# Patient Record
Sex: MALE | Race: BLACK OR AFRICAN AMERICAN | Employment: FULL TIME | URBAN - METROPOLITAN AREA
[De-identification: names, ages, dates, MRNs, and addresses within clinical notes are randomized per-mention and may not be internally consistent; named-entity substitution may affect disease eponyms.]

---

## 2017-03-01 ENCOUNTER — OFFICE VISIT (OUTPATIENT)
Dept: ENDOCRINOLOGY | Age: 52
End: 2017-03-01

## 2017-03-01 VITALS
SYSTOLIC BLOOD PRESSURE: 120 MMHG | TEMPERATURE: 98 F | HEIGHT: 70 IN | DIASTOLIC BLOOD PRESSURE: 96 MMHG | WEIGHT: 255 LBS | BODY MASS INDEX: 36.51 KG/M2 | HEART RATE: 91 BPM | RESPIRATION RATE: 14 BRPM

## 2017-03-01 DIAGNOSIS — I10 ESSENTIAL HYPERTENSION: ICD-10-CM

## 2017-03-01 DIAGNOSIS — E78.2 MIXED HYPERLIPIDEMIA: ICD-10-CM

## 2017-03-01 LAB
GLUCOSE POC: 185 MG/DL
HBA1C MFR BLD HPLC: 9.9 %

## 2017-03-01 RX ORDER — LANCETS
EACH MISCELLANEOUS
Qty: 100 EACH | Refills: 6 | Status: SHIPPED | OUTPATIENT
Start: 2017-03-01 | End: 2022-05-13

## 2017-03-01 RX ORDER — GLIPIZIDE 5 MG/1
TABLET ORAL
Qty: 60 TAB | Refills: 6 | Status: SHIPPED | OUTPATIENT
Start: 2017-03-01 | End: 2019-10-07 | Stop reason: SDUPTHER

## 2017-03-01 NOTE — PATIENT INSTRUCTIONS
Check blood sugars only twice a day by rotation as follows    First day - before b-fast and - before lunch  Second day- before dinner and  before bed time    After 2 days go back to same rotation            Start on Invokana  300 mg once a day before b-fast ( drink plenty of water )    Start on bydureon 2 mg a day ( preferred )  Victoza 0.6 mg a day and after a week, increase to 1.2 mg a day ( non-preferred )    Stay on  Kombiglyze xr       Start on glipizide 5 mg twice a day , twenty min before b-fast and dinner         Do not skip meals  Do not eat in between meals    Reduce carbs- pasta, rice, potatoes, bread   Do not drink juices or sodas  Donot eat peanut butter     Do not eat sugar free cookies and cakes   Do not eat peaches, grapes, oranges, raisins, pineapples

## 2017-03-01 NOTE — PROGRESS NOTES
HISTORY OF PRESENT ILLNESS   Fermín Ortiz is a 54 y.o. male. HPI   F/u for DM 2 after 2 year again after last visit from Dec  2014   He visits once every 2 years, that has become norm for him    He could not f/u sooner   Because of insurance reasons he says   He get KG xr at no cost - surprise? He could nto tolerate Trulicity for severe GI side effects , stopped it   He could nto get victoza as plan changed       He has no meter, had not f/u with his pcp   He does feel  hyperglycemic symptoms , polyuria and extreme thirst         Past Medical History:   Diagnosis Date    Hyperlipidemia 8/19/2011       Social History     Social History    Marital status:      Spouse name: N/A    Number of children: N/A    Years of education: N/A     Occupational History    Not on file. Social History Main Topics    Smoking status: Never Smoker    Smokeless tobacco: Not on file    Alcohol use No    Drug use: No    Sexual activity: Yes     Partners: Female     Other Topics Concern    Not on file     Social History Narrative       Family History   Problem Relation Age of Onset    Heart Disease Mother     Hypertension Mother     Diabetes Father     Hypertension Father                   Review of Systems   Constitutional: Negative. HENT: Negative. Eyes: Negative for pain and redness. Respiratory: Negative. Cardiovascular: Negative for chest pain, palpitations and leg swelling. Gastrointestinal: Negative. Negative for constipation. Genitourinary: polyuria   Musculoskeletal: Negative for myalgias. Skin: Negative. Neurological: Negative. Endo/Heme/Allergies: Negative. Psychiatric/Behavioral: Negative for depression and memory loss. The patient does not have insomnia               Physical Exam   Constitutional: He is oriented to person, place, and time. He appears well-developed and well-nourished. HENT:   Head: Normocephalic.    Eyes: Conjunctivae and extraocular motions are normal. Pupils are equal, round, and reactive to light. Neck: Normal range of motion. Neck supple. No JVD present. No tracheal deviation present. No thyromegaly present. Cardiovascular: Normal rate, regular rhythm and normal heart sounds. Pulmonary/Chest: Effort normal and breath sounds normal.   Abdominal: Soft. Bowel sounds are normal.   Musculoskeletal: Normal range of motion. Lymphadenopathy:   He has no cervical adenopathy. Neurological: He is alert and oriented to person, place, and time. He has normal reflexes. Skin: Skin is warm. Psychiatric: He has a normal mood and affect.             ASSESSMENT and PLAN            1. DM 2 poorly cotrolled : A1C is   9.9 %  From    March 2017    compared to 7.7 % From dec 2014 compared to 7.3 % compared to 7.5 % jak from 6.2%. stopped Byetta     Lost control from not being compliant with diet and meds   Continue on kombiglyze xr 2.5/1 gm bid     victoza was stopped by formulary   Trulicity was stopped because of severe GI issues     Made him check on deductible today with me - it is 3000 $    Start on Invokana  300 mg once a day before b-fast ( drink plenty of water )  Start on bydureon 2 mg a day ( preferred )  Stay on  Kombiglyze xr     Start on glipizide 5 mg twice a day , twenty min before b-fast and dinner    If he calls back, I need to give him meds where he gets some deductible help           2. HTN : well controlled on norvasc and diovan hctz   Dr. Dorina Curran prescribes them         3. Lipids used to be in good range .    Encouraging to stay on low dose statin and has not gotten on it await labs now         ASA dailly      Labs today   F/u sooner as there was a gap of 2 years since last seen        > 50 % of time is spent on counseling about deductible, choosing right meds, med actions and f/u

## 2017-03-01 NOTE — MR AVS SNAPSHOT
Visit Information Date & Time Provider Department Dept. Phone Encounter #  
 3/1/2017  9:15 AM Alli Gotit MD [facility] Diabetes & Endocrinology [phone] [id number] Follow-up Instructions Return in about 1 month (around 4/1/2017). Upcoming Health Maintenance Date Due  
 FOOT EXAM Q1 12/2/1977 EYE EXAM RETINAL OR DILATED Q1 12/2/1977 Pneumococcal 19-64 Medium Risk (1 of 1 - PPSV23) 12/2/1986 DTaP/Tdap/Td series (1 - Tdap) 12/2/1988 MICROALBUMIN Q1 8/8/2012 HEMOGLOBIN A1C Q6M 11/6/2014 LIPID PANEL Q1 5/6/2015 INFLUENZA AGE 9 TO ADULT 8/1/2016 Allergies as of 3/1/2017  Review Complete On: 3/1/2017 By: Alli Gotti MD  
  
 Severity Noted Reaction Type Reactions Codeine High 02/01/2011   Systemic Other (comments) Pt stated that it makes him \"crazy\"-not aware of actions. Current Immunizations  Never Reviewed No immunizations on file. Not reviewed this visit You Were Diagnosed With   
  
 Codes Comments Uncontrolled type 2 diabetes mellitus with complication, without long-term current use of insulin ([facility] [address]    -  Primary ICD-10-CM: E11.8, E11.65 ICD-9-CM: 250.82 Essential hypertension     ICD-10-CM: I10 
ICD-9-CM: 401.9 Mixed hyperlipidemia     ICD-10-CM: E78.2 ICD-9-CM: 272.2 Vitals Smoking Status Never Smoker Preferred Pharmacy Pharmacy Name Phone [address] [address] [phone] Your Updated Medication List  
  
   
This list is accurate as of: 3/1/17  9:52 AM.  Always use your most recent med list. AMLODIPINE PO Take  by mouth. aspirin delayed-release 81 mg tablet Take 81 mg by mouth daily. DIOVAN -12.5 mg per tablet Generic drug:  valsartan-hydroCHLOROthiazide Take 1 Tab by mouth daily. * dulaglutide 0.75 mg/0.5 mL sub-q pen Commonly known as:  TRULICITY 0.75 mg by SubCUTAneous route every seven (7) days. * dulaglutide 1.5 mg/0.5 mL sub-q pen Commonly known as:  TRULICITY  
1.5 mg by SubCUTAneous route every seven (7) days. * dulaglutide 1.5 mg/0.5 mL sub-q pen Commonly known as:  TRULICITY  
1.5 mg by SubCUTAneous route every seven (7) days. * dulaglutide 1.5 mg/0.5 mL sub-q pen Commonly known as:  TRULICITY  
1.5 mg by SubCUTAneous route every seven (7) days. glucose blood VI test strips strip Commonly known as:  ONETOUCH ULTRA TEST Test up to 4 times daily. Dx code 250.00  
  
 ibuprofen 800 mg tablet Commonly known as:  MOTRIN Take  by mouth every six (6) hours as needed. Insulin Needles (Disposable) 31 gauge x 3/16\" Ndle Commonly known as:  SURE-FINE PEN NEEDLES  
50 Packages by Does Not Apply route daily. Liraglutide 0.6 mg/0.1 mL (18 mg/3 mL) sub-q pen Commonly known as:  VICTOZA  
0.2 mL by SubCUTAneous route daily. metFORMIN 1,000 mg tablet Commonly known as:  GLUCOPHAGE Take 1 Tab by mouth two (2) times daily (with meals). omeprazole 20 mg capsule Commonly known as:  PRILOSEC Take 20 mg by mouth as needed. sAXagliptin-metFORMIN 2.5-1,000 mg Tm24 Commonly known as:  KOMBIGLYZE XR Take 1 tablet by mouth two (2) times a day. sildenafil citrate 25 mg tablet Commonly known as:  VIAGRA Take 2 tablets by mouth as needed. * Notice: This list has 4 medication(s) that are the same as other medications prescribed for you. Read the directions carefully, and ask your doctor or other care provider to review them with you. We Performed the Following AMB POC GLUCOSE, QUANTITATIVE, BLOOD [79964 CPT(R)] AMB POC HEMOGLOBIN A1C [44663 CPT(R)] LIPID PANEL [73970 CPT(R)] METABOLIC PANEL, COMPREHENSIVE [05571 CPT(R)] MICROALBUMIN, UR, RAND W/ MICROALBUMIN/CREA RATIO [id number] CPT(R)] Follow-up Instructions Return in about 1 month (around 4/1/2017). Patient Instructions Check blood sugars only twice a day by rotation as follows First day - before b-fast and - before lunch Second day- before dinner and  before bed time After 2 days go back to same rotation Start on Invokana  300 mg once a day before b-fast ( drink plenty of water ) Start on bydureon 2 mg a day ( preferred ) Victoza 0.6 mg a day and after a week, increase to 1.2 mg a day ( non-preferred ) Stay on  [address] [place], [place] [address]Th [facility]    
 
Start on glipizide 5 mg twice a day , twenty min before b-fast and dinner Do not skip meals Do not eat in between meals Reduce carbs- pasta, rice, potatoes, bread Do not drink juices or sodas Donot eat peanut butter Do not eat sugar free cookies and cakes Do not eat peaches, grapes, oranges, raisins, pineapples Introducing [facility] [place] [facility]! Jose Dixon introduces myaNUMBER patient portal. Now you can access parts of your medical record, email your doctor's office, and request medication refills online. 1. In your internet browser, go to [email or web address] 2. Click on the First Time User? Click Here link in the Sign In box. You will see the New Member Sign Up page. 3. Enter your myaNUMBER Access Code exactly as it appears below. You will not need to use this code after youve completed the sign-up process. If you do not sign up before the expiration date, you must request a new code. · myaNUMBER Access Code: [email or web address] Expires: 5/30/2017  9:46 AM 
 
4. Enter the last four digits of your Social Security Number (xxxx) and Date of Birth (mm/dd/yyyy) as indicated and click Submit. You will be taken to the next sign-up page. 5. Create a [employer] ID. This will be your [employer] login ID and cannot be changed, so think of one that is secure and easy to remember. 6. Create a [employer] password. You can change your password at any time. 7. Enter your Password Reset Question and Answer. This can be used at a later time if you forget your password. 8. Enter your e-mail address. You will receive e-mail notification when new information is available in [address] 9. Click Sign Up. You can now view and download portions of your medical record. 10. Click the Download Summary menu link to download a portable copy of your medical information. If you have questions, please visit the Frequently Asked Questions section of the MyPrintCloud website. Remember, MyPrintCloud is NOT to be used for urgent needs. For medical emergencies, dial 911. Now available from your iPhone and Android! Please provide this summary of care documentation to your next provider. [facility]. If you have any questions after today's visit, please call [phone].

## 2018-01-25 DIAGNOSIS — E11.65 TYPE 2 DIABETES MELLITUS WITH HYPERGLYCEMIA, WITHOUT LONG-TERM CURRENT USE OF INSULIN (HCC): Primary | ICD-10-CM

## 2018-01-25 NOTE — TELEPHONE ENCOUNTER
Attempted to call patient in regards to invokana no longer being covered by insurance and new prescription sent to pharmacy for [facility]. Mailbox full unable to leave voice mail.

## 2018-02-23 RX ORDER — SAXAGLIPTIN AND METFORMIN HYDROCHLORIDE 2.5; 1 MG/1; MG/1
TABLET, FILM COATED, EXTENDED RELEASE ORAL
Qty: 60 TAB | Refills: 6 | Status: SHIPPED | OUTPATIENT
Start: 2018-02-23 | End: 2019-10-07 | Stop reason: ALTCHOICE

## 2018-08-24 ENCOUNTER — OP HISTORICAL/CONVERTED ENCOUNTER (OUTPATIENT)
Dept: OTHER | Age: 53
End: 2018-08-24

## 2018-10-12 DIAGNOSIS — E11.65 TYPE 2 DIABETES MELLITUS WITH HYPERGLYCEMIA, WITHOUT LONG-TERM CURRENT USE OF INSULIN (HCC): ICD-10-CM

## 2018-10-12 RX ORDER — EMPAGLIFLOZIN 25 MG/1
TABLET, FILM COATED ORAL
Qty: 30 TAB | Refills: 6 | OUTPATIENT
Start: 2018-10-12

## 2019-10-07 ENCOUNTER — OFFICE VISIT (OUTPATIENT)
Dept: ENDOCRINOLOGY | Age: 54
End: 2019-10-07

## 2019-10-07 VITALS
DIASTOLIC BLOOD PRESSURE: 78 MMHG | WEIGHT: 259.1 LBS | SYSTOLIC BLOOD PRESSURE: 117 MMHG | OXYGEN SATURATION: 98 % | BODY MASS INDEX: 37.09 KG/M2 | HEART RATE: 87 BPM | RESPIRATION RATE: 18 BRPM | HEIGHT: 70 IN | TEMPERATURE: 97.1 F

## 2019-10-07 DIAGNOSIS — E78.2 MIXED HYPERLIPIDEMIA: ICD-10-CM

## 2019-10-07 DIAGNOSIS — E11.65 TYPE 2 DIABETES MELLITUS WITH HYPERGLYCEMIA, WITHOUT LONG-TERM CURRENT USE OF INSULIN (HCC): Primary | ICD-10-CM

## 2019-10-07 DIAGNOSIS — I10 ESSENTIAL HYPERTENSION: ICD-10-CM

## 2019-10-07 DIAGNOSIS — E11.65 TYPE 2 DIABETES MELLITUS WITH HYPERGLYCEMIA, WITHOUT LONG-TERM CURRENT USE OF INSULIN (HCC): ICD-10-CM

## 2019-10-07 LAB — HBA1C MFR BLD HPLC: 7.9 %

## 2019-10-07 RX ORDER — GLIPIZIDE 5 MG/1
TABLET ORAL
Qty: 120 TAB | Refills: 4 | Status: SHIPPED | OUTPATIENT
Start: 2019-10-07 | End: 2020-01-23 | Stop reason: SDUPTHER

## 2019-10-07 RX ORDER — CHOLECALCIFEROL (VITAMIN D3) 125 MCG
100 CAPSULE ORAL DAILY
COMMUNITY

## 2019-10-07 NOTE — PROGRESS NOTES
HISTORY OF PRESENT ILLNESS   Darnell Gonzalez is a 48 y.o. male.    HPI   F/u for DM 2 after 2 year again after last visit from in 2017     Here he goes , A VISIT  AFTER 2 YEARS   HE SAYS HE LOST CONTROL AND IS HERE AGAIN     NO METER IN HAND   HIS SUGARS ARE RUNNING HIGHER             Old history  :   He visits once every 2 years, that has become norm for him     He could not f/u sooner   Because of insurance reasons he says   He get KG xr at no cost - surprise?     He could nto tolerate Trulicity for severe GI side effects , stopped it   He could nto get victoza as plan changed       He has no meter, had not f/u with his pcp   He does feel  hyperglycemic symptoms , polyuria and extreme thirst         Past Medical History:   Diagnosis Date    Hyperlipidemia 8/19/2011       Social History     Socioeconomic History    Marital status:      Spouse name: Not on file    Number of children: Not on file    Years of education: Not on file    Highest education level: Not on file   Occupational History    Not on file   Social Needs    Financial resource strain: Not on file    Food insecurity:     Worry: Not on file     Inability: Not on file    Transportation needs:     Medical: Not on file     Non-medical: Not on file   Tobacco Use    Smoking status: Never Smoker    Smokeless tobacco: Never Used   Substance and Sexual Activity    Alcohol use: No    Drug use: No    Sexual activity: Yes     Partners: Female   Lifestyle    Physical activity:     Days per week: Not on file     Minutes per session: Not on file    Stress: Not on file   Relationships    Social connections:     Talks on phone: Not on file     Gets together: Not on file     Attends [employer] service: Not on file     Active member of club or organization: Not on file     Attends meetings of clubs or organizations: Not on file     Relationship status: Not on file    Intimate partner violence:     Fear of current or ex partner: Not on file Emotionally abused: Not on file     Physically abused: Not on file     Forced sexual activity: Not on file   Other Topics Concern    Not on file   Social History Narrative    Not on file       Family History   Problem Relation Age of Onset    Heart Disease Mother     Hypertension Mother     Diabetes Father     Hypertension Father      Ros :  Constitutional: NAD   HENT:  NEGATIVE   Cardiovascular: No PALPITATIONS   Pulmonary/Chest: NO SOB  Abdominal: Soft. Musculoskeletal: Normal range of motion. Neurological:  normal reflexes. Skin: Skin is warm and dry. Psychiatric: He has a normal mood and affect. Physical Exam   Constitutional: He is oriented to person, place, and time. He appears well-developed and well-nourished. HENT:   Head: Normocephalic. Eyes: Pupils are equal, round, and reactive to light. Conjunctivae and EOM are normal.   Neck: Normal range of motion. Neck supple. Cardiovascular: Normal rate and regular rhythm. Pulmonary/Chest: Effort normal and breath sounds normal.   Abdominal: Soft. Bowel sounds are normal.   Musculoskeletal: Normal range of motion. Neurological: He is alert and oriented to person, place, and time. He has normal reflexes. Skin: Skin is warm and dry. Psychiatric: He has a normal mood and affect. Labs : NONE NEW         1. TYPE 2 DM UN CONTROLLED  :  A1C IS  7.9 %  BY poc  OCT 2019     Lost control from not being compliant with diet and meds  AND FOLLOW UPS   NO METER FOR REVIEW     Continue on kombiglyze xr 2.5/1 gm bid   victoza was stopped by formulary   Trulicity was stopped because of severe GI issues      Start on bydureon 2 mg a day ( preferred )  CHANGE TO  JANUMET XR  FROM  Kombiglyze xr     INCREASE  ON   glipizide 5 mg  2 PILLS    twice a day , twenty min before b-fast and dinner    HE IS INSISTED ON FOLLOW UPS     Patient is advised about checking blood sugars 4 times a day and maintaining log book.  The danger of having low blood sugars has been explained with inappropriate use of insulin  Patient voiced understanding and using the printed instructions at home. Hypoglycemia management has been explained to the patient. lab results and schedule of future lab studies reviewed with patient  specific diabetic recommendations: diabetic diet discussed in detail, written exchange diet given, low cholesterol diet, weight control and daily exercise discussed, home glucose monitoring emphasized, home glucose monitoring demonstrated and taught, glucose meter dispensed to patient, all medications, side effects and compliance discussed carefully and use and side effects of insulin is taught    2. Hypoglycemia :  Educated on treating the hypoglycemia. Discussed Glucagon use and prescribed    3. HTN : continue current care. Patient is educated about importance of compliance with anti-hypertensives especially ARB/ACEI    4. Dyslipidemia : continue current meds. Patient is educated about benefits and adverse effects of statins and explained how benefits outweigh risk.     5. use of aspirin to prevent MI and TIA's discussed        FOLLOW UPS AGAIN EMPHASIZED WITH METER AND LABS     > 50 % of time is spent on counseling   Patient voiced understanding her plan of care

## 2019-10-07 NOTE — PATIENT INSTRUCTIONS
Check blood sugars BEFORE EACH MEAL          JARDIANCE  25   mg once a day before b-fast ( drink plenty of water )     Start on bydureon 2 mg a day      Stay on  JANUMET XR 50/1000 MG TWICE  A DAY WITH MEALS      INCREASE   on glipizide 5 mg  TWO PILLS,    twice a day , twenty min before b-fast and dinner            Do not skip meals  Do not eat in between meals     Reduce carbs- pasta, rice, potatoes, bread   Do not drink juices or sodas  Donot eat peanut butter      Do not eat sugar free cookies and cakes   Do not eat peaches, grapes, oranges, raisins, pineapples

## 2019-10-07 NOTE — PROGRESS NOTES
Room 1    Identified pt with two pt identifiers(name and ). Reviewed record in preparation for visit and have obtained necessary documentation. All patient medications has been reviewed. Chief Complaint   Patient presents with    Diabetes    Blood sugar problem     pt states, is having trouble keeping sugar down. x 2 weeks. Health Maintenance Due   Topic    Pneumococcal 0-64 years (1 of 1 - PPSV23)    FOOT EXAM Q1     EYE EXAM RETINAL OR DILATED     DTaP/Tdap/Td series (1 - Tdap)    MICROALBUMIN Q1     LIPID PANEL Q1     HEMOGLOBIN A1C Q6M     Shingrix Vaccine Age 50> (1 of 2)    FOBT Q 1 YEAR AGE 54-65     Influenza Age 5 to Adult        Vitals:    10/07/19 1338   BP: 117/78   Pulse: 87   Resp: 18   Temp: 97.1 °F (36.2 °C)   TempSrc: Oral   SpO2: 98%   Weight: 259 lb 1.6 oz (117.5 kg)   Height: 5' 10\" (1.778 m)   PainSc:   0 - No pain       Coordination of Care Questionnaire:   1) Have you been to an emergency room, urgent care, or hospitalized since your last visit?   no       2. Have seen or consulted any other health care provider since your last visit? NO    3) Do you have an Advanced Directive/ Living Will in place? NO  If yes, do we have a copy on file NO  If no, would you like information NO    Patient is accompanied by self I have received verbal consent from Shamir Gonzalez to discuss any/all medical information while they are present in the room.

## 2019-10-07 NOTE — LETTER
10/7/19 Patient: Shamir Gonzalez YOB: 1965 Date of Visit: 10/7/2019 Jeanine Burns MD 
[address] [facility] [address] VIA Facsimile: [phone] Dear Jeanine Burns MD, Thank you for referring Mr. Shamir Gonzalez to [id number] [place] [address] for evaluation. My notes for this consultation are attached. If you have questions, please do not hesitate to call me. I look forward to following your patient along with you. Sincerely, Victoria Petty MD

## 2019-10-08 NOTE — PROGRESS NOTES
HISTORY OF PRESENT ILLNESS Elton Montano is a 48 y.o. male. HPI Review of Systems Constitutional: Negative. HENT: Negative. Eyes: Negative. Respiratory: Negative. Cardiovascular: Negative. Gastrointestinal: Negative. Genitourinary: Negative. Musculoskeletal: Negative. Skin: Negative. Neurological: Negative. Endo/Heme/Allergies: Negative. Psychiatric/Behavioral: Negative. Physical Exam 
 
ASSESSMENT and PLAN 
{ASSESSMENT/PLAN:15873}

## 2019-10-09 ENCOUNTER — DOCUMENTATION ONLY (OUTPATIENT)
Dept: ENDOCRINOLOGY | Age: 54
End: 2019-10-09

## 2019-10-09 ENCOUNTER — TELEPHONE (OUTPATIENT)
Dept: ENDOCRINOLOGY | Age: 54
End: 2019-10-09

## 2019-10-09 LAB
ALBUMIN SERPL-MCNC: 4.5 G/DL (ref 3.5–5.5)
ALBUMIN/CREAT UR: 6.1 MG/G CREAT (ref 0–30)
ALBUMIN/GLOB SERPL: 1.5 {RATIO} (ref 1.2–2.2)
ALP SERPL-CCNC: 83 IU/L (ref 39–117)
ALT SERPL-CCNC: 48 IU/L (ref 0–44)
AST SERPL-CCNC: 30 IU/L (ref 0–40)
BILIRUB SERPL-MCNC: 1.3 MG/DL (ref 0–1.2)
BUN SERPL-MCNC: 11 MG/DL (ref 6–24)
BUN/CREAT SERPL: 13 (ref 9–20)
CALCIUM SERPL-MCNC: 9.5 MG/DL (ref 8.7–10.2)
CHLORIDE SERPL-SCNC: 99 MMOL/L (ref 96–106)
CHOLEST SERPL-MCNC: 186 MG/DL (ref 100–199)
CO2 SERPL-SCNC: 23 MMOL/L (ref 20–29)
CREAT SERPL-MCNC: 0.87 MG/DL (ref 0.76–1.27)
CREAT UR-MCNC: 120 MG/DL
GLOBULIN SER CALC-MCNC: 3 G/DL (ref 1.5–4.5)
GLUCOSE SERPL-MCNC: 119 MG/DL (ref 65–99)
HDLC SERPL-MCNC: 48 MG/DL
INTERPRETATION, 910389: NORMAL
LDLC SERPL CALC-MCNC: 122 MG/DL (ref 0–99)
Lab: NORMAL
MICROALBUMIN UR-MCNC: 7.3 UG/ML
POTASSIUM SERPL-SCNC: 4.1 MMOL/L (ref 3.5–5.2)
PROT SERPL-MCNC: 7.5 G/DL (ref 6–8.5)
SODIUM SERPL-SCNC: 136 MMOL/L (ref 134–144)
TRIGL SERPL-MCNC: 80 MG/DL (ref 0–149)
VLDLC SERPL CALC-MCNC: 16 MG/DL (ref 5–40)

## 2019-10-09 NOTE — TELEPHONE ENCOUNTER
Left message for a return phone call to inform     I got a request to clear him for dental surgery  Date sept 19 2019   I have not seen him for 2 years and was under care of pcp            Yes, I saw him on oct 7 2019, he did nto mention a word about the above   And got labs done , a1c is 7.9 %      But I did nto have meter to review daily sugars     I can only clear when meter is downloaded   Inform pt please   Kirti Garnica MD

## 2019-10-09 NOTE — TELEPHONE ENCOUNTER
----- Message from Rimma Zamudio sent at 10/9/2019 12:09 PM EDT -----  Regarding: /Telephone   Patient return call    Caller's first and last name and relationship (if not the patient):Pt       Best contact number(s)::[phone]      Whose call is being returned:Nurse      Details to clarify the request:Pt returned call to the office (unsure of reason).       Tania Stovall

## 2019-10-09 NOTE — PROGRESS NOTES
I got a request to clear him for dental surgery  Date sept 19 2019   I have not seen him for 2 years and was under care of pcp         Yes, I saw him on oct 7 2019, he did nto mention a word about the above   And got labs done , a1c is 7.9 %     But I did nto have meter to review daily sugars    I can only clear when meter is downloaded   Inform pt please     Evelyne Shankar MD

## 2019-10-10 ENCOUNTER — DOCUMENTATION ONLY (OUTPATIENT)
Dept: ENDOCRINOLOGY | Age: 54
End: 2019-10-10

## 2019-10-10 NOTE — PROGRESS NOTES
Discussed with patient that I cannot clear him for surgery after reviewing the lgo   Sugars are over 200 and at times over 300     I saw him recently , gave new regimen , and would like to wait until it is better       He agreed     Liz Yen MD

## 2019-11-06 ENCOUNTER — DOCUMENTATION ONLY (OUTPATIENT)
Dept: ENDOCRINOLOGY | Age: 54
End: 2019-11-06

## 2019-11-06 NOTE — PROGRESS NOTES
Spoke to patient. Informed patient to come in for A1C check. Patient states he already has a lab appointment scheduled for January and would like to keep that appointment. Patient does not want to come in sooner for A1C check.

## 2019-11-06 NOTE — PROGRESS NOTES
Make him come in for a1c check also if he intents to proceed for dental surgery   It there  No rush for dental work, to inform his dentist itself to wait until next appt is done     His next appt is in [address]     Marilia Brady MD

## 2019-11-06 NOTE — PROGRESS NOTES
This patient's dentist has requested 3 times to clear him for  Surgery     This patient lapsed on follow up for 2 years and came back a month ago roughly and could not clear him   Will get a1c done and log be brought for upload and then I can clear him     Pt was made aware at last visit that this is how it is going to be       So, call him to get a1c and log     Torito Barlow MD

## 2019-11-06 NOTE — PROGRESS NOTES
Ok,   His dentis has faxed papers to clear him so many times     I am deferring to clear him until jan 2020      Jalyn Grace MD

## 2020-01-21 LAB
ALBUMIN SERPL-MCNC: 4.5 G/DL (ref 3.5–5.5)
ALBUMIN/CREAT UR: 7.7 MG/G CREAT (ref 0–30)
ALBUMIN/GLOB SERPL: 1.5 {RATIO} (ref 1.2–2.2)
ALP SERPL-CCNC: 85 IU/L (ref 39–117)
ALT SERPL-CCNC: 45 IU/L (ref 0–44)
AST SERPL-CCNC: 24 IU/L (ref 0–40)
BILIRUB SERPL-MCNC: 1.2 MG/DL (ref 0–1.2)
BUN SERPL-MCNC: 15 MG/DL (ref 6–24)
BUN/CREAT SERPL: 13 (ref 9–20)
CALCIUM SERPL-MCNC: 9.8 MG/DL (ref 8.7–10.2)
CHLORIDE SERPL-SCNC: 100 MMOL/L (ref 96–106)
CHOLEST SERPL-MCNC: 162 MG/DL (ref 100–199)
CO2 SERPL-SCNC: 20 MMOL/L (ref 20–29)
CREAT SERPL-MCNC: 1.18 MG/DL (ref 0.76–1.27)
CREAT UR-MCNC: 117.5 MG/DL
EST. AVERAGE GLUCOSE BLD GHB EST-MCNC: 131 MG/DL
GLOBULIN SER CALC-MCNC: 3 G/DL (ref 1.5–4.5)
GLUCOSE SERPL-MCNC: 115 MG/DL (ref 65–99)
HBA1C MFR BLD: 6.2 % (ref 4.8–5.6)
HDLC SERPL-MCNC: 48 MG/DL
INTERPRETATION, 910389: NORMAL
LDLC SERPL CALC-MCNC: 97 MG/DL (ref 0–99)
MICROALBUMIN UR-MCNC: 9.1 UG/ML
POTASSIUM SERPL-SCNC: 4.8 MMOL/L (ref 3.5–5.2)
PROT SERPL-MCNC: 7.5 G/DL (ref 6–8.5)
SODIUM SERPL-SCNC: 138 MMOL/L (ref 134–144)
TRIGL SERPL-MCNC: 84 MG/DL (ref 0–149)
VLDLC SERPL CALC-MCNC: 17 MG/DL (ref 5–40)

## 2020-01-23 ENCOUNTER — OFFICE VISIT (OUTPATIENT)
Dept: ENDOCRINOLOGY | Age: 55
End: 2020-01-23

## 2020-01-23 VITALS
RESPIRATION RATE: 18 BRPM | HEART RATE: 94 BPM | TEMPERATURE: 97.4 F | DIASTOLIC BLOOD PRESSURE: 87 MMHG | HEIGHT: 70 IN | SYSTOLIC BLOOD PRESSURE: 121 MMHG | OXYGEN SATURATION: 98 % | BODY MASS INDEX: 37.8 KG/M2 | WEIGHT: 264 LBS

## 2020-01-23 DIAGNOSIS — E78.2 MIXED HYPERLIPIDEMIA: ICD-10-CM

## 2020-01-23 DIAGNOSIS — E11.65 TYPE 2 DIABETES MELLITUS WITH HYPERGLYCEMIA, WITHOUT LONG-TERM CURRENT USE OF INSULIN (HCC): Primary | ICD-10-CM

## 2020-01-23 DIAGNOSIS — E11.65 TYPE 2 DIABETES MELLITUS WITH HYPERGLYCEMIA, WITHOUT LONG-TERM CURRENT USE OF INSULIN (HCC): ICD-10-CM

## 2020-01-23 DIAGNOSIS — I10 ESSENTIAL HYPERTENSION: ICD-10-CM

## 2020-01-23 RX ORDER — ATORVASTATIN CALCIUM 20 MG/1
20 TABLET, FILM COATED ORAL
Qty: 30 TAB | Refills: 6 | Status: SHIPPED | OUTPATIENT
Start: 2020-01-23 | End: 2020-12-24

## 2020-01-23 RX ORDER — GLIPIZIDE 5 MG/1
TABLET ORAL
Qty: 60 TAB | Refills: 6 | Status: SHIPPED | OUTPATIENT
Start: 2020-01-23 | End: 2020-10-14

## 2020-01-23 NOTE — PROGRESS NOTES
Room 2    Identified pt with two pt identifiers(name and ). Reviewed record in preparation for visit and have obtained necessary documentation. All patient medications has been reviewed. Chief Complaint   Patient presents with    Diabetes    Diabetic Foot Exam    Medication Evaluation     pt states, during bydureon inj he has been receiving knots after injection. Pt states, has been rotating injection sites. Health Maintenance Due   Topic    Pneumococcal 0-64 years (1 of 1 - PPSV23)    FOOT EXAM Q1     EYE EXAM RETINAL OR DILATED     DTaP/Tdap/Td series (1 - Tdap)    Shingrix Vaccine Age 50> (1 of 2)    FOBT Q 1 YEAR AGE 54-65     Influenza Age 5 to Adult        Vitals:    20 1320   BP: 121/87   Pulse: 94   Resp: 18   Temp: 97.4 °F (36.3 °C)   TempSrc: Oral   SpO2: 98%   Weight: 264 lb (119.7 kg)   Height: 5' 10\" (1.778 m)   PainSc:   0 - No pain       Wt Readings from Last 3 Encounters:   20 264 lb (119.7 kg)   10/07/19 259 lb 1.6 oz (117.5 kg)   17 255 lb (115.7 kg)     Temp Readings from Last 3 Encounters:   20 97.4 °F (36.3 °C) (Oral)   10/07/19 97.1 °F (36.2 °C) (Oral)   17 98 °F (36.7 °C) (Oral)     BP Readings from Last 3 Encounters:   20 121/87   10/07/19 117/78   17 (!) 120/96     Pulse Readings from Last 3 Encounters:   20 94   10/07/19 87   17 91       Lab Results   Component Value Date/Time    Hemoglobin A1c 6.2 (H) 2020 10:40 AM    Hemoglobin A1c (POC) 7.9 10/07/2019 02:00 PM       Coordination of Care Questionnaire:   1) Have you been to an emergency room, urgent care, or hospitalized since your last visit?   no       2. Have seen or consulted any other health care provider since your last visit? NO    3) Do you have an Advanced Directive/ Living Will in place?  NO  If yes, do we have a copy on file NO  If no, would you like information NO    Patient is accompanied by self I have received verbal consent from Jeremiah Campos to discuss any/all medical information while they are present in the room.

## 2020-01-23 NOTE — PROGRESS NOTES
HISTORY OF PRESENT ILLNESS   Rohith Mckeon is a 48 y.o. male.    HPI   F/u for DM 2 after 2 year again after last visit from in October 2019     He has done well   Gained 5 lbs   Thanking me that he did good with diabetes   No meter      Old history :     Here he goes , A VISIT  AFTER 2 YEARS   HE SAYS HE LOST CONTROL AND IS HERE AGAIN     NO METER IN HAND   HIS SUGARS ARE RUNNING HIGHER             Old history  :   He visits once every 2 years, that has become norm for him     He could not f/u sooner   Because of insurance reasons he says   He get KG xr at no cost - surprise?     He could nto tolerate Trulicity for severe GI side effects , stopped it   He could nto get victoza as plan changed       He has no meter, had not f/u with his pcp   He does feel  hyperglycemic symptoms , polyuria and extreme thirst         Past Medical History:   Diagnosis Date    Hyperlipidemia 8/19/2011       Social History     Socioeconomic History    Marital status:      Spouse name: Not on file    Number of children: Not on file    Years of education: Not on file    Highest education level: Not on file   Occupational History    Not on file   Social Needs    Financial resource strain: Not on file    Food insecurity:     Worry: Not on file     Inability: Not on file    Transportation needs:     Medical: Not on file     Non-medical: Not on file   Tobacco Use    Smoking status: Never Smoker    Smokeless tobacco: Never Used   Substance and Sexual Activity    Alcohol use: No    Drug use: No    Sexual activity: Yes     Partners: Female   Lifestyle    Physical activity:     Days per week: Not on file     Minutes per session: Not on file    Stress: Not on file   Relationships    Social connections:     Talks on phone: Not on file     Gets together: Not on file     Attends [employer] service: Not on file     Active member of club or organization: Not on file     Attends meetings of clubs or organizations: Not on file Relationship status: Not on file    Intimate partner violence:     Fear of current or ex partner: Not on file     Emotionally abused: Not on file     Physically abused: Not on file     Forced sexual activity: Not on file   Other Topics Concern    Not on file   Social History Narrative    Not on file       Family History   Problem Relation Age of Onset    Heart Disease Mother     Hypertension Mother     Diabetes Father     Hypertension Father      Ros :  Constitutional: NAD   HENT:  NEGATIVE   Cardiovascular: No PALPITATIONS   Pulmonary/Chest: NO SOB  Abdominal: Soft. Musculoskeletal: Normal range of motion. Neurological:  normal reflexes. Skin: Skin is warm and dry. Psychiatric: He has a normal mood and affect. Physical Exam   Constitutional: He is oriented to person, place, and time. He appears well-developed and well-nourished. HENT:   Head: Normocephalic. Eyes: Pupils are equal, round, and reactive to light. Conjunctivae and EOM are normal.   Neck: Normal range of motion. Neck supple. Cardiovascular: Normal rate and regular rhythm. Pulmonary/Chest: Effort normal and breath sounds normal.   Abdominal: Soft. Bowel sounds are normal.   Musculoskeletal: Normal range of motion. Neurological: He is alert and oriented to person, place, and time. He has normal reflexes. Skin: Skin is warm and dry. Psychiatric: He has a normal mood and affect.          Labs : NONE NEW         1. TYPE 2 DM UN CONTROLLED  :  A1C IS  6.2 %     From    Jan 17 2019     compared to    7.9 %  BY poc  OCT 2019     Improved control from  being compliant with diet and meds  AND FOLLOW UPS   NO METER FOR REVIEW  again    Continue on janumet xr   victoza was stopped by formulary   Trulicity was stopped because of severe GI issues   Stay  on bydureon 2 mg a day ( preferred )  Decrease to    glipizide 5 mg   To one  PILL  twice a day , twenty min before b-fast and dinner, as he has understood the reason behind it Patient is advised about checking blood sugars 4 times a day and maintaining log book. The danger of having low blood sugars has been explained with inappropriate use of insulin  Patient voiced understanding and using the printed instructions at home. Hypoglycemia management has been explained to the patient. lab results and schedule of future lab studies reviewed with patient    2. Hypoglycemia :  Educated on treating the hypoglycemia. Discussed Glipizide  use and prescribed    3. HTN : continue diovan-hctz . Patient is educated about importance of compliance with anti-hypertensives especially ARB/ACEI    4. Dyslipidemia : not on statins . Patient is educated about benefits and adverse effects of statins and explained how benefits outweigh risk.     5. use of aspirin to prevent MI and TIA's discussed          > 50 % of time is spent on counseling   Patient voiced understanding her plan of care

## 2020-01-23 NOTE — PATIENT INSTRUCTIONS
Check blood sugars BEFORE EACH MEAL   
  
  
JARDIANCE  25   mg once a day before b-fast ( drink plenty of water ) 
  
Stay  on bydureon 2 mg a day  
  
Stay on  JANUMET XR 50/1000 MG TWICE  A DAY WITH MEALS  
  
 decrease glipizide 5 mg  One  PILL    twice a day , twenty min before b-fast and dinner  
 
 
---------------------------------------------------------------------------------------------------------------------------------------------------------- Start on  lipitor 20 at night  
  
  
  
Do not skip meals Do not eat in between meals 
  
Reduce carbs- pasta, rice, potatoes, bread Do not drink juices or sodas Donot eat peanut butter  
  
Do not eat sugar free cookies and cakes Do not eat peaches, grapes, oranges, raisins, pineapples

## 2020-01-23 NOTE — LETTER
1/23/20 Patient: Cheri Pineda YOB: 1965 Date of Visit: 1/23/2020 Joby Roberts MD 
[facility] [address] VIA Facsimile: [phone] Dear Joby Roberts MD, Thank you for referring Mr. Gomez Soto to [id number] [place] [address] for evaluation. My notes for this consultation are attached. If you have questions, please do not hesitate to call me. I look forward to following your patient along with you. Sincerely, Eusebia Salazar MD

## 2020-06-08 DIAGNOSIS — E78.2 MIXED HYPERLIPIDEMIA: ICD-10-CM

## 2020-06-08 DIAGNOSIS — E11.65 TYPE 2 DIABETES MELLITUS WITH HYPERGLYCEMIA, WITHOUT LONG-TERM CURRENT USE OF INSULIN (HCC): Primary | ICD-10-CM

## 2020-07-01 DIAGNOSIS — E11.65 TYPE 2 DIABETES MELLITUS WITH HYPERGLYCEMIA, WITHOUT LONG-TERM CURRENT USE OF INSULIN (HCC): ICD-10-CM

## 2020-07-01 DIAGNOSIS — E78.2 MIXED HYPERLIPIDEMIA: ICD-10-CM

## 2020-08-05 RX ORDER — VALSARTAN AND HYDROCHLOROTHIAZIDE 320; 12.5 MG/1; MG/1
TABLET, FILM COATED ORAL
Qty: 90 TAB | Refills: 0 | Status: SHIPPED | OUTPATIENT
Start: 2020-08-05 | End: 2020-08-07

## 2020-08-07 ENCOUNTER — TELEPHONE (OUTPATIENT)
Dept: FAMILY MEDICINE CLINIC | Age: 55
End: 2020-08-07

## 2020-08-07 RX ORDER — VALSARTAN AND HYDROCHLOROTHIAZIDE 320; 12.5 MG/1; MG/1
1 TABLET, FILM COATED ORAL DAILY
Qty: 90 TAB | Refills: 0 | Status: SHIPPED | OUTPATIENT
Start: 2020-08-07 | End: 2020-10-14

## 2020-08-29 RX ORDER — AMLODIPINE BESYLATE 10 MG/1
TABLET ORAL
Qty: 30 TAB | Refills: 0 | Status: SHIPPED | OUTPATIENT
Start: 2020-08-29 | End: 2020-10-06

## 2020-09-02 VITALS
BODY MASS INDEX: 36.22 KG/M2 | DIASTOLIC BLOOD PRESSURE: 82 MMHG | WEIGHT: 253 LBS | HEIGHT: 70 IN | SYSTOLIC BLOOD PRESSURE: 124 MMHG

## 2020-09-02 PROBLEM — G89.29 CHRONIC BACK PAIN: Status: ACTIVE | Noted: 2020-09-02

## 2020-09-02 PROBLEM — R59.1 LYMPHADENOPATHY: Status: ACTIVE | Noted: 2020-09-02

## 2020-09-02 PROBLEM — K59.09 CHRONIC CONSTIPATION: Status: ACTIVE | Noted: 2020-09-02

## 2020-09-02 PROBLEM — E65 CENTRAL OBESITY: Status: ACTIVE | Noted: 2020-09-02

## 2020-09-02 PROBLEM — R03.0 ELEVATED BLOOD PRESSURE READING: Status: ACTIVE | Noted: 2020-09-02

## 2020-09-02 PROBLEM — M54.9 CHRONIC BACK PAIN: Status: ACTIVE | Noted: 2020-09-02

## 2020-09-02 PROBLEM — L02.01 ABSCESS OF FACE: Status: ACTIVE | Noted: 2020-09-02

## 2020-09-02 PROBLEM — M54.40 LUMBAGO WITH SCIATICA: Status: ACTIVE | Noted: 2020-09-02

## 2020-09-02 PROBLEM — M94.0 COSTAL CHONDRITIS: Status: ACTIVE | Noted: 2020-09-02

## 2020-09-02 PROBLEM — S43.409A SPRAIN OF SHOULDER: Status: ACTIVE | Noted: 2020-09-02

## 2020-09-02 PROBLEM — R73.9 HYPERGLYCEMIA: Status: ACTIVE | Noted: 2020-09-02

## 2020-09-02 PROBLEM — K11.9 DISORDER OF SALIVARY GLAND: Status: ACTIVE | Noted: 2020-09-02

## 2020-10-06 RX ORDER — AMLODIPINE BESYLATE 10 MG/1
TABLET ORAL
Qty: 30 TAB | Refills: 0 | Status: SHIPPED | OUTPATIENT
Start: 2020-10-06 | End: 2020-11-13

## 2020-10-14 ENCOUNTER — TELEPHONE (OUTPATIENT)
Dept: FAMILY MEDICINE CLINIC | Age: 55
End: 2020-10-14

## 2020-10-14 ENCOUNTER — OFFICE VISIT (OUTPATIENT)
Dept: FAMILY MEDICINE CLINIC | Age: 55
End: 2020-10-14
Payer: COMMERCIAL

## 2020-10-14 VITALS
BODY MASS INDEX: 37.31 KG/M2 | WEIGHT: 260.6 LBS | SYSTOLIC BLOOD PRESSURE: 150 MMHG | RESPIRATION RATE: 18 BRPM | HEART RATE: 76 BPM | HEIGHT: 70 IN | TEMPERATURE: 96.8 F | DIASTOLIC BLOOD PRESSURE: 100 MMHG | OXYGEN SATURATION: 98 %

## 2020-10-14 DIAGNOSIS — I11.9 MALIGNANT HYPERTENSIVE HEART DISEASE WITHOUT HEART FAILURE: ICD-10-CM

## 2020-10-14 DIAGNOSIS — E78.3 MIXED HYPERGLYCERIDEMIA: ICD-10-CM

## 2020-10-14 DIAGNOSIS — R03.0 ELEVATED BLOOD PRESSURE READING: Primary | ICD-10-CM

## 2020-10-14 DIAGNOSIS — E66.01 SEVERE OBESITY (HCC): ICD-10-CM

## 2020-10-14 DIAGNOSIS — E11.9 CONTROLLED TYPE 2 DIABETES MELLITUS WITHOUT COMPLICATION, WITHOUT LONG-TERM CURRENT USE OF INSULIN (HCC): ICD-10-CM

## 2020-10-14 PROCEDURE — 99214 OFFICE O/P EST MOD 30 MIN: CPT | Performed by: FAMILY MEDICINE

## 2020-10-14 RX ORDER — LOSARTAN POTASSIUM AND HYDROCHLOROTHIAZIDE 25; 100 MG/1; MG/1
1 TABLET ORAL DAILY
COMMUNITY
Start: 2020-10-06 | End: 2020-11-25 | Stop reason: ALTCHOICE

## 2020-10-14 RX ORDER — NEBIVOLOL 5 MG/1
5 TABLET ORAL DAILY
Qty: 30 TAB | Refills: 2 | Status: SHIPPED | OUTPATIENT
Start: 2020-10-14 | End: 2021-12-22

## 2020-10-14 NOTE — PROGRESS NOTES
Sylvania Halsted is a 48 y.o. male and presents with Follow Up Chronic Condition; Diabetes; and Hypertension (refused flu shot)  . HPI     Subjective:  Cardiovascular Review:  The patient has hypertension   Diet and Lifestyle: generally follows a low fat low cholesterol diet, generally follows a low sodium diet, exercises sporadically  Home BP Monitoring: is not measured at home. Pertinent ROS: taking medications as instructed, no medication side effects noted, no TIA's, no chest pain on exertion, no dyspnea on exertion, no swelling of ankles. Review of Systems  Review of Systems   Constitutional: Negative. Negative for chills and fever. HENT: Negative. Negative for congestion, ear discharge, hearing loss, nosebleeds and tinnitus. Eyes: Negative. Negative for blurred vision, double vision, photophobia and pain. Respiratory: Negative. Negative for cough, hemoptysis and sputum production. Cardiovascular: Negative. Negative for chest pain and palpitations. Gastrointestinal: Negative. Negative for heartburn, nausea and vomiting. Genitourinary: Negative. Negative for dysuria, frequency and urgency. Musculoskeletal: Negative. Negative for back pain and myalgias. Skin: Negative. Neurological: Negative. Negative for dizziness, tingling, weakness and headaches. Endo/Heme/Allergies: Negative. Psychiatric/Behavioral: Negative. Negative for depression and suicidal ideas. The patient does not have insomnia. All other systems reviewed and are negative.         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    Elevated blood pressure reading 9/2/2020    Hyperglycemia 9/2/2020    Hyperlipidemia 8/19/2011    Lumbago with sciatica 9/2/2020    Lymphadenopathy 9/2/2020    Sprain of shoulder 9/2/2020 Past Surgical History:   Procedure Laterality Date    HX ACL RECONSTRUCTION      left knee    HX ORTHOPAEDIC       Social History     Socioeconomic History    Marital status:      Spouse name: Not on file    Number of children: Not on file    Years of education: Not on file    Highest education level: Not on file   Tobacco Use    Smoking status: Never Smoker    Smokeless tobacco: Never Used   Substance and Sexual Activity    Alcohol use: No    Drug use: No    Sexual activity: Yes     Partners: Female     Family History   Problem Relation Age of Onset    Heart Disease Mother     Hypertension Mother     Diabetes Father     Hypertension Father     Hypertension Sister     Hypertension Brother      Current Outpatient Medications   Medication Sig Dispense Refill    losartan-hydroCHLOROthiazide (HYZAAR) 100-25 mg per tablet Take 1 Tab by mouth daily.  amLODIPine (NORVASC) 10 mg tablet Take 1 tablet by mouth once daily for 30 days 30 Tab 0    empagliflozin (JARDIANCE) 25 mg tablet Take 1 Tab by mouth daily. 30 Tab 6    exenatide microspheres (BYDUREON) 2 mg/0.65 mL pnij 0.65 mL by SubCUTAneous route every seven (7) days. Stop victoza, stop trulicity 4 Pen 6    SITagliptin-metFORMIN (JANUMET XR) 50-1,000 mg TM24 Take one tablet twice daily with meals 60 Tab 6    atorvastatin (LIPITOR) 20 mg tablet Take 1 Tab by mouth nightly. 30 Tab 6    co-enzyme Q-10 (CO Q-10) 100 mg capsule Take 100 mg by mouth daily.  glucose blood VI test strips (ACCU-CHEK SMARTVIEW TEST STRIP) strip Test 2 times daily. Dx code E11.65 100 Strip 6    Lancets (ACCU-CHEK FASTCLIX) misc Test 2 times daily. Dx code E11.65 100 Each 6    Insulin Needles, Disposable, (SURE-FINE PEN NEEDLES) 31 x 3/16 \" Ndle 50 Packages by Does Not Apply route daily. 50 Each 6    aspirin delayed-release 81 mg tablet Take 81 mg by mouth daily.        Allergies   Allergen Reactions    Codeine Other (comments)     Pt stated that it makes him \"crazy\"-not aware of actions.  Trulicity [Dulaglutide] Nausea and Vomiting       Objective:  Visit Vitals  BP (!) 150/100 (BP 1 Location: Left arm, BP Patient Position: Sitting)   Pulse 76   Temp 96.8 °F (36 °C) (Temporal)   Resp 18   Ht 5' 10\" (1.778 m)   Wt 260 lb 9.6 oz (118.2 kg)   SpO2 98% Comment: room air   BMI 37.39 kg/m²       Physical Exam:   Physical Exam  Vitals signs and nursing note reviewed. Constitutional:       General: He is not in acute distress. Appearance: Normal appearance. He is obese. He is not ill-appearing, toxic-appearing or diaphoretic. HENT:      Head: Normocephalic and atraumatic. Right Ear: Tympanic membrane, ear canal and external ear normal. There is no impacted cerumen. Left Ear: Tympanic membrane, ear canal and external ear normal. There is no impacted cerumen. Nose: Nose normal. No congestion or rhinorrhea. Mouth/Throat:      Mouth: Mucous membranes are moist.      Pharynx: Oropharynx is clear. No oropharyngeal exudate or posterior oropharyngeal erythema. Eyes:      General: No scleral icterus. Right eye: No discharge. Left eye: No discharge. Extraocular Movements: Extraocular movements intact. Conjunctiva/sclera: Conjunctivae normal.      Pupils: Pupils are equal, round, and reactive to light. Neck:      Musculoskeletal: Normal range of motion and neck supple. No neck rigidity or muscular tenderness. Vascular: No carotid bruit. Cardiovascular:      Rate and Rhythm: Normal rate and regular rhythm. Pulses: Normal pulses. Heart sounds: Normal heart sounds. No murmur. No friction rub. No gallop. Pulmonary:      Effort: Pulmonary effort is normal. No respiratory distress. Breath sounds: Normal breath sounds. No stridor. No wheezing, rhonchi or rales. Chest:      Chest wall: No tenderness. Abdominal:      General: Abdomen is flat. Bowel sounds are normal. There is no distension. Palpations: Abdomen is soft. There is no mass. Tenderness: There is no abdominal tenderness. There is no right CVA tenderness, left CVA tenderness, guarding or rebound. Hernia: No hernia is present. Musculoskeletal: Normal range of motion. General: No swelling, tenderness, deformity or signs of injury. Right lower leg: No edema. Left lower leg: No edema. Lymphadenopathy:      Cervical: No cervical adenopathy. Skin:     General: Skin is warm. Capillary Refill: Capillary refill takes 2 to 3 seconds. Coloration: Skin is not jaundiced or pale. Findings: No bruising, erythema, lesion or rash. Neurological:      General: No focal deficit present. Mental Status: He is alert and oriented to person, place, and time. Mental status is at baseline. Cranial Nerves: No cranial nerve deficit. Sensory: No sensory deficit. Motor: No weakness. Coordination: Coordination normal.      Gait: Gait normal.      Deep Tendon Reflexes: Reflexes normal.   Psychiatric:         Mood and Affect: Mood normal.         Behavior: Behavior normal.         Thought Content: Thought content normal.         Judgment: Judgment normal.             Results for orders placed or performed in visit on 99/41/74   METABOLIC PANEL, COMPREHENSIVE   Result Value Ref Range    Glucose 115 (H) 65 - 99 mg/dL    BUN 15 6 - 24 mg/dL    Creatinine 1.18 0.76 - 1.27 mg/dL    GFR est non-AA 71 >59 mL/min/1.73    GFR est AA 82 >59 mL/min/1.73    BUN/Creatinine ratio 13 9 - 20    Sodium 138 134 - 144 mmol/L    Potassium 4.8 3.5 - 5.2 mmol/L    Chloride 100 96 - 106 mmol/L    CO2 20 20 - 29 mmol/L    Calcium 9.8 8.7 - 10.2 mg/dL    Protein, total 7.5 6.0 - 8.5 g/dL    Albumin 4.5 3.5 - 5.5 g/dL    GLOBULIN, TOTAL 3.0 1.5 - 4.5 g/dL    A-G Ratio 1.5 1.2 - 2.2    Bilirubin, total 1.2 0.0 - 1.2 mg/dL    Alk.  phosphatase 85 39 - 117 IU/L    AST (SGOT) 24 0 - 40 IU/L    ALT (SGPT) 45 (H) 0 - 44 IU/L   LIPID PANEL   Result Value Ref Range    Cholesterol, total 162 100 - 199 mg/dL    Triglyceride 84 0 - 149 mg/dL    HDL Cholesterol 48 >39 mg/dL    VLDL, calculated 17 5 - 40 mg/dL    LDL, calculated 97 0 - 99 mg/dL   MICROALBUMIN, UR, RAND W/ MICROALB/CREAT RATIO   Result Value Ref Range    Creatinine, urine 117.5 Not Estab. mg/dL    Microalbumin, urine 9.1 Not Estab. ug/mL    Microalb/Creat ratio (ug/mg creat.) 7.7 0.0 - 30.0 mg/g creat   HEMOGLOBIN A1C WITH EAG   Result Value Ref Range    Hemoglobin A1c 6.2 (H) 4.8 - 5.6 %    Estimated average glucose 131 mg/dL   CVD REPORT   Result Value Ref Range    INTERPRETATION Note        Assessment/Plan:    ICD-10-CM ICD-9-CM    1. Elevated blood pressure reading  X92.9 187.0     bystolic 5mg daily started for elevated BP in pt whose insurance would not cover Diovan that pt had been on for over 14 years   2. Severe obesity ([facility] [address]  E66.01 278.01    3. Malignant hypertensive heart disease without heart failure  I82.6 601.34 METABOLIC PANEL, BASIC   4. Mixed hyperglyceridemia  E78.3 272.3    5. Controlled type 2 diabetes mellitus without complication, without long-term current use of insulin ([facility])  R33.6 421.49 METABOLIC PANEL, BASIC      HEMOGLOBIN A1C WITH EAG      AMB POC GLUCOSE BLOOD, BY GLUCOSE MONITORING DEVICE     Orders Placed This Encounter    losartan-hydroCHLOROthiazide (HYZAAR) 100-25 mg per tablet     Sig: Take 1 Tab by mouth daily. Cannot display discharge medications since this is not an admission.

## 2020-10-16 ENCOUNTER — TELEPHONE (OUTPATIENT)
Dept: FAMILY MEDICINE CLINIC | Age: 55
End: 2020-10-16

## 2020-10-21 RX ORDER — IBUPROFEN 600 MG/1
TABLET ORAL
Qty: 50 TAB | Refills: 0 | Status: SHIPPED | OUTPATIENT
Start: 2020-10-21 | End: 2021-05-12 | Stop reason: SDUPTHER

## 2020-11-13 RX ORDER — AMLODIPINE BESYLATE 10 MG/1
TABLET ORAL
Qty: 30 TAB | Refills: 0 | Status: SHIPPED | OUTPATIENT
Start: 2020-11-13 | End: 2021-01-16

## 2020-11-25 ENCOUNTER — OFFICE VISIT (OUTPATIENT)
Dept: ENDOCRINOLOGY | Age: 55
End: 2020-11-25
Payer: COMMERCIAL

## 2020-11-25 VITALS
HEIGHT: 70 IN | BODY MASS INDEX: 37.51 KG/M2 | TEMPERATURE: 97 F | RESPIRATION RATE: 18 BRPM | HEART RATE: 78 BPM | OXYGEN SATURATION: 98 % | SYSTOLIC BLOOD PRESSURE: 144 MMHG | WEIGHT: 262 LBS | DIASTOLIC BLOOD PRESSURE: 88 MMHG

## 2020-11-25 DIAGNOSIS — E78.2 MIXED HYPERLIPIDEMIA: ICD-10-CM

## 2020-11-25 DIAGNOSIS — I10 ESSENTIAL HYPERTENSION: ICD-10-CM

## 2020-11-25 DIAGNOSIS — E11.65 TYPE 2 DIABETES MELLITUS WITH HYPERGLYCEMIA, WITHOUT LONG-TERM CURRENT USE OF INSULIN (HCC): Primary | ICD-10-CM

## 2020-11-25 DIAGNOSIS — E11.65 TYPE 2 DIABETES MELLITUS WITH HYPERGLYCEMIA, WITHOUT LONG-TERM CURRENT USE OF INSULIN (HCC): ICD-10-CM

## 2020-11-25 DIAGNOSIS — E29.1 HYPOGONADISM MALE: ICD-10-CM

## 2020-11-25 LAB — HBA1C MFR BLD HPLC: 7.6 %

## 2020-11-25 PROCEDURE — 3051F HG A1C>EQUAL 7.0%<8.0%: CPT | Performed by: INTERNAL MEDICINE

## 2020-11-25 PROCEDURE — 99215 OFFICE O/P EST HI 40 MIN: CPT | Performed by: INTERNAL MEDICINE

## 2020-11-25 PROCEDURE — 83036 HEMOGLOBIN GLYCOSYLATED A1C: CPT | Performed by: INTERNAL MEDICINE

## 2020-11-25 RX ORDER — GLIPIZIDE 5 MG/1
TABLET ORAL
Qty: 60 TAB | Refills: 6 | Status: SHIPPED | OUTPATIENT
Start: 2020-11-25 | End: 2021-12-22 | Stop reason: SDUPTHER

## 2020-11-25 RX ORDER — EXENATIDE 2 MG/.65ML
2 INJECTION, SUSPENSION, EXTENDED RELEASE SUBCUTANEOUS
Qty: 4 PEN | Refills: 6 | Status: SHIPPED | OUTPATIENT
Start: 2020-11-25 | End: 2021-01-15

## 2020-11-25 RX ORDER — SITAGLIPTIN AND METFORMIN HYDROCHLORIDE 50; 1000 MG/1; MG/1
TABLET, FILM COATED, EXTENDED RELEASE ORAL
Qty: 60 TAB | Refills: 6 | Status: SHIPPED | OUTPATIENT
Start: 2020-11-25 | End: 2021-11-26

## 2020-11-25 RX ORDER — VALSARTAN 320 MG/1
TABLET ORAL DAILY
COMMUNITY
End: 2021-12-22

## 2020-11-25 NOTE — PROGRESS NOTES
HISTORY OF PRESENT ILLNESS   Isha Medina is a 48  y.o. male. HPI   F/u for DM 2 after 2 year again after last visit from in jan 2020     No labs,   No meter , not checking sugars   Came late ( did not know we moved )     HE SAW DR. MYLES - IN THE INTERIM FOR HTN  meds were adjusted   He has NO MED BOTTLES     C/o  ED  CIALIS DOES NOT WORK he says         Jan 2020     He has done well   Gained 5 lbs   Thanking me that he did good with diabetes   No meter      Oct 2019 :     Here he goes , A VISIT  AFTER 2 YEARS   HE SAYS HE LOST CONTROL AND IS HERE AGAIN     NO METER IN HAND   HIS SUGARS ARE RUNNING HIGHER             Old history  :   He visits once every 2 years, that has become norm for him     He could not f/u sooner   Because of insurance reasons he says   He get KG xr at no cost - surprise?     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    Elevated blood pressure reading 9/2/2020    Hyperglycemia 9/2/2020    Hyperlipidemia 8/19/2011    Lumbago with sciatica 9/2/2020    Lymphadenopathy 9/2/2020    Sprain of shoulder 9/2/2020       Social History     Socioeconomic History    Marital status:      Spouse name: Not on file    Number of children: Not on file    Years of education: Not on file    Highest education level: Not on file   Occupational History    Not on file   Social Needs    Financial resource strain: Not on file    Food insecurity     Worry: Not on file     Inability: Not on file    Transportation needs     Medical: Not on file     Non-medical: Not on file Tobacco Use    Smoking status: Never Smoker    Smokeless tobacco: Never Used   Substance and Sexual Activity    Alcohol use: No    Drug use: No    Sexual activity: Yes     Partners: Female   Lifestyle    Physical activity     Days per week: Not on file     Minutes per session: Not on file    Stress: Not on file   Relationships    Social connections     Talks on phone: Not on file     Gets together: Not on file     Attends Worship service: Not on file     Active member of club or organization: Not on file     Attends meetings of clubs or organizations: Not on file     Relationship status: Not on file    Intimate partner violence     Fear of current or ex partner: Not on file     Emotionally abused: Not on file     Physically abused: Not on file     Forced sexual activity: Not on file   Other Topics Concern    Not on file   Social History Narrative    Not on file       Family History   Problem Relation Age of Onset    Heart Disease Mother     Hypertension Mother     Diabetes Father     Hypertension Father     Hypertension Sister     Hypertension Brother      Ros :  Constitutional: NAD   HENT:  NEGATIVE   Cardiovascular: No PALPITATIONS   Pulmonary/Chest: NO SOB  Abdominal: Soft. Musculoskeletal: Normal range of motion. Neurological:  normal reflexes. Skin: Skin is warm and dry. Psychiatric: He has a normal mood and affect. Physical Exam   Constitutional: He is oriented to person, place, and time. He appears well-developed and well-nourished. HENT:   Head: Normocephalic. Eyes: Pupils are equal, round, and reactive to light. Conjunctivae and EOM are normal.   Neck: Normal range of motion. Neck supple. Cardiovascular: Normal rate and regular rhythm. Pulmonary/Chest: Effort normal and breath sounds normal.   Abdominal: Soft. Bowel sounds are normal.   Musculoskeletal: Normal range of motion. Neurological: He is alert and oriented to person, place, and time.  He has normal reflexes. Skin: Skin is warm and dry. Psychiatric: He has a normal mood and affect. Labs : NONE NEW         1. TYPE 2 DM UN CONTROLLED  :  A1C IS  7.6 %      From today by Nov 2020   Compared to   6.2 %     From    Jan 17 2019     compared to    7.9 %  BY poc  OCT 2019     Nov 2020   No meter   Not cheking   Working more   He is not compliant with diet     Stay on bydureon,glipizide and janumet xr   He has to take glipizide diligently          Jan 2020   Improved control from  being compliant with diet and meds  AND FOLLOW UPS   NO METER FOR REVIEW  again    Continue on janumet xr   Minerva Lorenzo was stopped by formulary   Trulicity was stopped because of severe GI issues   Stay  on bydureon 2 mg a day ( preferred )  Decrease to    glipizide 5 mg   To one  PILL  twice a day , twenty min before b-fast and dinner, as he has understood the reason behind it     Patient is advised about checking blood sugars 4 times a day and maintaining log book. The danger of having low blood sugars has been explained with inappropriate use of insulin  Patient voiced understanding and using the printed instructions at home. Hypoglycemia management has been explained to the patient. lab results and schedule of future lab studies reviewed with patient    2. Hypoglycemia :  Educated on treating the hypoglycemia. Discussed Glipizide  use and prescribed    3. HTN : UNCONTROLLED - CONFUSION ON MY END   he likes diovan-hctz  BUT IT IS EXPENSIVE  AND INSURANCE SAYS IT IS NOT COVERED   DR. AMAYA MYLES ALSO MANAGES IT TOO   ON AMLODIPINE, HE IS ON BYSTOLIC  ?? So, he  Got DioVAN  320 MG A DAY   . Patient is educated about importance of compliance with anti-hypertensives especially ARB/ACEI    4. Dyslipidemia : I started on lipitor  . Patient is educated about benefits and adverse effects of statins and explained how benefits outweigh risk. 5. use of aspirin to prevent MI and TIA's discussed      6.  ED-  NEW DIAGNOSIS , HYPOGONADism   Patient is explained the need for pituitary hormone check   Need for 2 sets of fasting labs   Need for MRI if pituitary cause is suspected     Discussed pros and cons of TT use   Denies any prostrate cancer in family   Denies any premature CAD in family         > 50 % of time is spent on counseling   Patient voiced understanding her plan of care

## 2020-11-25 NOTE — PROGRESS NOTES
1. Have you been to the ER, urgent care clinic since your last visit? yes-urgent care, June, 2020, spider bite   Hospitalized since your last visit? no    2. Have you seen or consulted any other health care providers outside of the [address] [place] [address] since your last visit?   Include any pap smears or colon screening PCP-October,2020    Wt Readings from Last 3 Encounters:   11/25/20 262 lb (118.8 kg)   10/14/20 260 lb 9.6 oz (118.2 kg)   09/02/20 253 lb (114.8 kg)     Temp Readings from Last 3 Encounters:   11/25/20 97 °F (36.1 °C) (Oral)   10/14/20 96.8 °F (36 °C) (Temporal)   01/23/20 97.4 °F (36.3 °C) (Oral)     BP Readings from Last 3 Encounters:   11/25/20 (!) 144/88   10/14/20 (!) 150/100   09/02/20 124/82     Pulse Readings from Last 3 Encounters:   11/25/20 78   10/14/20 76   01/23/20 94

## 2020-11-25 NOTE — LETTER
11/26/20 Patient: Nadya Bustos YOB: 1965 Date of Visit: 11/25/2020 Jean Inman MD 
[address] [id number] VIA In Basket Dear Jean Inman MD, Thank you for referring Mr. Soraida Pearce to [id number] [place] [address] for evaluation. My notes for this consultation are attached. If you have questions, please do not hesitate to call me. I look forward to following your patient along with you. Sincerely, Rory Singh MD

## 2020-11-25 NOTE — PATIENT INSTRUCTIONS
Check blood sugars BEFORE EACH MEAL   
  
  
JARDIANCE  25   mg once a day before b-fast ( drink plenty of water ) 
  
Stay  on bydureon 2 mg a day  
  
Stay on  JANUMET XR 50/1000 MG TWICE  A DAY WITH MEALS  
  
 glipizide 5 mg  One  PILL    twice a day , twenty min before b-fast and dinner Do not take it if you are not eating Cut the pill to half if eating lesser than normal  
Do not avoid lunches  
 
 
---------------------------------------------------------------------------------------------------------------------------------------------------------- Stay  on  lipitor 20 at night  
  
  
  
Do not skip meals Do not eat in between meals 
  
Reduce carbs- pasta, rice, potatoes, bread Do not drink juices or sodas Donot eat peanut butter  
  
Do not eat sugar free cookies and cakes Do not eat peaches, grapes, oranges, raisins, pineapples

## 2020-11-26 LAB
ALBUMIN SERPL-MCNC: 4.6 G/DL (ref 3.8–4.9)
ALBUMIN/CREAT UR: 14 MG/G CREAT (ref 0–29)
ALBUMIN/GLOB SERPL: 1.4 {RATIO} (ref 1.2–2.2)
ALP SERPL-CCNC: 109 IU/L (ref 39–117)
ALT SERPL-CCNC: 45 IU/L (ref 0–44)
AST SERPL-CCNC: 31 IU/L (ref 0–40)
BILIRUB SERPL-MCNC: 1.5 MG/DL (ref 0–1.2)
BUN SERPL-MCNC: 11 MG/DL (ref 6–24)
BUN/CREAT SERPL: 11 (ref 9–20)
CALCIUM SERPL-MCNC: 9.7 MG/DL (ref 8.7–10.2)
CHLORIDE SERPL-SCNC: 102 MMOL/L (ref 96–106)
CHOLEST SERPL-MCNC: 157 MG/DL (ref 100–199)
CO2 SERPL-SCNC: 24 MMOL/L (ref 20–29)
CREAT SERPL-MCNC: 1 MG/DL (ref 0.76–1.27)
CREAT UR-MCNC: 49 MG/DL
GLOBULIN SER CALC-MCNC: 3.4 G/DL (ref 1.5–4.5)
GLUCOSE SERPL-MCNC: 206 MG/DL (ref 65–99)
HDLC SERPL-MCNC: 51 MG/DL
INTERPRETATION, 910389: NORMAL
LDLC SERPL CALC-MCNC: 92 MG/DL (ref 0–99)
MICROALBUMIN UR-MCNC: 6.7 UG/ML
POTASSIUM SERPL-SCNC: 4.3 MMOL/L (ref 3.5–5.2)
PROT SERPL-MCNC: 8 G/DL (ref 6–8.5)
SODIUM SERPL-SCNC: 139 MMOL/L (ref 134–144)
TRIGL SERPL-MCNC: 69 MG/DL (ref 0–149)
VLDLC SERPL CALC-MCNC: 14 MG/DL (ref 5–40)

## 2020-12-24 RX ORDER — ATORVASTATIN CALCIUM 20 MG/1
TABLET, FILM COATED ORAL
Qty: 30 TAB | Refills: 5 | Status: SHIPPED | OUTPATIENT
Start: 2020-12-24 | End: 2021-12-22 | Stop reason: SDUPTHER

## 2021-01-15 ENCOUNTER — OFFICE VISIT (OUTPATIENT)
Dept: FAMILY MEDICINE CLINIC | Age: 56
End: 2021-01-15
Payer: COMMERCIAL

## 2021-01-15 VITALS
BODY MASS INDEX: 38.89 KG/M2 | HEIGHT: 69 IN | SYSTOLIC BLOOD PRESSURE: 136 MMHG | TEMPERATURE: 96.6 F | HEART RATE: 70 BPM | RESPIRATION RATE: 20 BRPM | WEIGHT: 262.6 LBS | DIASTOLIC BLOOD PRESSURE: 82 MMHG | OXYGEN SATURATION: 98 %

## 2021-01-15 DIAGNOSIS — E78.3 MIXED HYPERGLYCERIDEMIA: ICD-10-CM

## 2021-01-15 DIAGNOSIS — E66.01 SEVERE OBESITY (HCC): ICD-10-CM

## 2021-01-15 DIAGNOSIS — E11.9 CONTROLLED TYPE 2 DIABETES MELLITUS WITHOUT COMPLICATION, WITH LONG-TERM CURRENT USE OF INSULIN (HCC): ICD-10-CM

## 2021-01-15 DIAGNOSIS — I11.9 MALIGNANT HYPERTENSIVE HEART DISEASE WITHOUT HEART FAILURE: Primary | ICD-10-CM

## 2021-01-15 DIAGNOSIS — Z79.4 CONTROLLED TYPE 2 DIABETES MELLITUS WITHOUT COMPLICATION, WITH LONG-TERM CURRENT USE OF INSULIN (HCC): ICD-10-CM

## 2021-01-15 PROCEDURE — 99214 OFFICE O/P EST MOD 30 MIN: CPT | Performed by: FAMILY MEDICINE

## 2021-01-15 NOTE — PROGRESS NOTES
Alcira Myles is a 50 y.o. male and presents with Follow Up Chronic Condition, Hypertension, and Diabetes  . HPI     Subjective:  Cardiovascular Review:  The patient has hypertension   Diet and Lifestyle: generally follows a low fat low cholesterol diet, generally follows a low sodium diet, exercises sporadically  Home BP Monitoring: is not measured at home. Pertinent ROS: taking medications as instructed, no medication side effects noted, no TIA's, no chest pain on exertion, no dyspnea on exertion, no swelling of ankles. Review of Systems  Review of Systems   Constitutional: Negative. Negative for chills and fever. HENT: Negative. Negative for congestion, ear discharge, hearing loss, nosebleeds and tinnitus. Eyes: Negative. Negative for blurred vision, double vision, photophobia and pain. Respiratory: Negative. Negative for cough, hemoptysis and sputum production. Cardiovascular: Negative. Negative for chest pain and palpitations. Gastrointestinal: Negative. Negative for heartburn, nausea and vomiting. Genitourinary: Negative. Negative for dysuria, frequency and urgency. Musculoskeletal: Negative. Negative for back pain and myalgias. Skin: Negative. Neurological: Negative. Negative for dizziness, tingling, weakness and headaches. Endo/Heme/Allergies: Negative. Psychiatric/Behavioral: Negative. Negative for depression and suicidal ideas. The patient does not have insomnia. All other systems reviewed and are negative.         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    Elevated blood pressure reading 9/2/2020    Hyperglycemia 9/2/2020    Hyperlipidemia 8/19/2011    Lumbago with sciatica 9/2/2020    Lymphadenopathy 9/2/2020    Sprain of shoulder 9/2/2020     Past Surgical History:   Procedure Laterality Date    HX ACL RECONSTRUCTION      left knee    HX ORTHOPAEDIC       Social History     Socioeconomic History    Marital status:      Spouse name: Not on file    Number of children: Not on file    Years of education: Not on file    Highest education level: Not on file   Tobacco Use    Smoking status: Never Smoker    Smokeless tobacco: Never Used   Substance and Sexual Activity    Alcohol use: No    Drug use: No    Sexual activity: Yes     Partners: Female     Family History   Problem Relation Age of Onset    Heart Disease Mother     Hypertension Mother     Diabetes Father     Hypertension Father     Hypertension Sister     Hypertension Brother      Current Outpatient Medications   Medication Sig Dispense Refill    atorvastatin (LIPITOR) 20 mg tablet Take 1 tablet by mouth nightly 30 Tab 5    valsartan (Diovan) 320 mg tablet Take  by mouth daily.  empagliflozin (Jardiance) 25 mg tablet Take 1 Tab by mouth daily. 30 Tab 6    exenatide microspheres (Bydureon) 2 mg/0.65 mL pnij 0.65 mL by SubCUTAneous route every seven (7) days. Stop victoza, stop trulicity 4 Pen 6    SITagliptin-metFORMIN (Janumet XR) 50-1,000 mg TM24 Take one tablet twice daily with meals 60 Tab 6    glipiZIDE (GLUCOTROL) 5 mg tablet Take one tablet 20 minutes before breakfast and dinner 60 Tab 6    amLODIPine (NORVASC) 10 mg tablet Take 1 tablet by mouth once daily for 30 days 30 Tab 0    ibuprofen (MOTRIN) 600 mg tablet TAKE 1 TABLET BY MOUTH TWICE DAILY AS NEEDED FOR 25 DAYS 50 Tab 0    nebivoloL (BYSTOLIC) 5 mg tablet Take 1 Tab by mouth daily. 30 Tab 2    co-enzyme Q-10 (CO Q-10) 100 mg capsule Take 100 mg by mouth daily.  glucose blood VI test strips (ACCU-CHEK SMARTVIEW TEST STRIP) strip Test 2 times daily. Dx code E11.65 100 Strip 6    Lancets (ACCU-CHEK FASTCLIX) misc Test 2 times daily.  Dx code E11.65 100 Each 6    Insulin Needles, Disposable, (SURE-FINE PEN NEEDLES) 31 x 3/16 \" Ndle 50 Packages by Does Not Apply route daily. 50 Each 6    aspirin delayed-release 81 mg tablet Take 81 mg by mouth daily. Allergies   Allergen Reactions    Codeine Other (comments)     Pt stated that it makes him \"crazy\"-not aware of actions.  Trulicity [Dulaglutide] Nausea and Vomiting       Objective:  Visit Vitals  /82 (BP 1 Location: Right arm, BP Patient Position: Sitting)   Pulse 70   Temp (!) 96.6 °F (35.9 °C) (Temporal)   Resp 20   Ht 5' 9\" (1.753 m)   Wt 262 lb 9.6 oz (119.1 kg)   SpO2 98% Comment: room air   BMI 38.78 kg/m²       Physical Exam:   Physical Exam  Vitals signs and nursing note reviewed. Constitutional:       General: He is not in acute distress. Appearance: Normal appearance. He is obese. He is not ill-appearing, toxic-appearing or diaphoretic. HENT:      Head: Normocephalic and atraumatic. Right Ear: Tympanic membrane, ear canal and external ear normal. There is no impacted cerumen. Left Ear: Tympanic membrane, ear canal and external ear normal. There is no impacted cerumen. Nose: Nose normal. No congestion or rhinorrhea. Mouth/Throat:      Mouth: Mucous membranes are moist.      Pharynx: Oropharynx is clear. No oropharyngeal exudate or posterior oropharyngeal erythema. Eyes:      General: No scleral icterus. Right eye: No discharge. Left eye: No discharge. Extraocular Movements: Extraocular movements intact. Conjunctiva/sclera: Conjunctivae normal.      Pupils: Pupils are equal, round, and reactive to light. Neck:      Musculoskeletal: Normal range of motion and neck supple. No neck rigidity or muscular tenderness. Vascular: No carotid bruit. Cardiovascular:      Rate and Rhythm: Normal rate and regular rhythm. Pulses: Normal pulses. Heart sounds: Normal heart sounds. No murmur. No friction rub. No gallop.     Pulmonary:      Effort: Pulmonary effort is normal. No respiratory distress. Breath sounds: Normal breath sounds. No stridor. No wheezing, rhonchi or rales. Chest:      Chest wall: No tenderness. Abdominal:      General: Abdomen is flat. Bowel sounds are normal. There is no distension. Palpations: Abdomen is soft. There is no mass. Tenderness: There is no abdominal tenderness. There is no right CVA tenderness, left CVA tenderness, guarding or rebound. Hernia: No hernia is present. Musculoskeletal: Normal range of motion. General: No swelling, tenderness, deformity or signs of injury. Right lower leg: No edema. Left lower leg: No edema. Lymphadenopathy:      Cervical: No cervical adenopathy. Skin:     General: Skin is warm. Capillary Refill: Capillary refill takes 2 to 3 seconds. Coloration: Skin is not jaundiced or pale. Findings: No bruising, erythema, lesion or rash. Neurological:      General: No focal deficit present. Mental Status: He is alert and oriented to person, place, and time. Mental status is at baseline. Cranial Nerves: No cranial nerve deficit. Sensory: No sensory deficit. Motor: No weakness. Coordination: Coordination normal.      Gait: Gait normal.      Deep Tendon Reflexes: Reflexes normal.   Psychiatric:         Mood and Affect: Mood normal.         Behavior: Behavior normal.         Thought Content:  Thought content normal.         Judgment: Judgment normal.             Results for orders placed or performed in visit on 16/88/49   METABOLIC PANEL, COMPREHENSIVE   Result Value Ref Range    Glucose 206 (H) 65 - 99 mg/dL    BUN 11 6 - 24 mg/dL    Creatinine 1.00 0.76 - 1.27 mg/dL    GFR est non-AA 86 >59 mL/min/1.73    GFR est  >59 mL/min/1.73    BUN/Creatinine ratio 11 9 - 20    Sodium 139 134 - 144 mmol/L    Potassium 4.3 3.5 - 5.2 mmol/L    Chloride 102 96 - 106 mmol/L    CO2 24 20 - 29 mmol/L    Calcium 9.7 8.7 - 10.2 mg/dL    Protein, total 8.0 6.0 - 8.5 g/dL Albumin 4.6 3.8 - 4.9 g/dL    GLOBULIN, TOTAL 3.4 1.5 - 4.5 g/dL    A-G Ratio 1.4 1.2 - 2.2    Bilirubin, total 1.5 (H) 0.0 - 1.2 mg/dL    Alk. phosphatase 109 39 - 117 IU/L    AST (SGOT) 31 0 - 40 IU/L    ALT (SGPT) 45 (H) 0 - 44 IU/L   LIPID PANEL   Result Value Ref Range    Cholesterol, total 157 100 - 199 mg/dL    Triglyceride 69 0 - 149 mg/dL    HDL Cholesterol 51 >39 mg/dL    VLDL, calculated 14 5 - 40 mg/dL    LDL, calculated 92 0 - 99 mg/dL   MICROALBUMIN, UR, RAND W/ MICROALB/CREAT RATIO   Result Value Ref Range    Creatinine, urine 49.0 Not Estab. mg/dL    Microalbumin, urine 6.7 Not Estab. ug/mL    Microalb/Creat ratio (ug/mg creat.) 14 0 - 29 mg/g creat   CVD REPORT   Result Value Ref Range    INTERPRETATION Note    AMB POC HEMOGLOBIN A1C   Result Value Ref Range    Hemoglobin A1c (POC) 7.6 %       Assessment/Plan:    ICD-10-CM ICD-9-CM    1. Malignant hypertensive heart disease without heart failure  I11.9 402.00    2. Mixed hyperglyceridemia  E78.3 272.3    3. Severe obesity ([facility] [address]  E66.01 278.01    4. Controlled type 2 diabetes mellitus without complication, with long-term current use of insulin (HCC)  E11.9 250.00     Z79.4 V58.67      No orders of the defined types were placed in this encounter. Cannot display discharge medications since this is not an admission.

## 2021-01-15 NOTE — PATIENT INSTRUCTIONS
Learning About Low-Fat Eating What is low-fat eating? Most food has some fat in it. Your body needs some fat to be healthy. But some kinds of fats are healthier than others. In a low-fat eating plan, you try to choose healthier fats and eat fewer unhealthy fats. Healthy fats include olive and canola oil. Try to avoid eating too much saturated fat (such as in cheese and meats) and trans fat (a type of fat found in many packaged snack foods and other baked goods). You do not need to cut all fat from your diet. But you can make healthier choices about the types and amount of fat you eat. Even though it is a good idea to choose healthier fats, it is still important to be careful of how much fat you eat, because all fats are high in calories. What are the different types of fats? Unhealthy fat · Saturated fat. Saturated fats are mostly in animal foods, such as meat and dairy foods. Tropical oils, such as coconut oil, palm oil, and cocoa butter, are also saturated fats. Healthy fats · Monounsaturated fat. Monounsaturated fats are liquid at room temperature but get solid when refrigerated. Eating foods that are high in this fat may help lower your \"bad\" (LDL) cholesterol, keep your \"good\" (HDL) cholesterol level up, and lower your chances of getting coronary artery disease. This fat is found in canola oil, olive oil, peanut oil, olives, avocados, nuts, and nut butters. · Polyunsaturated fat. Polyunsaturated fats are liquid at room temperature. They are in safflower, sunflower, and corn oils. They are also the main fat in seafood. Omega-3 fatty acids are types of polyunsaturated fat. Eating fish may lower your chances of getting coronary artery disease. Fatty fish such as salmon and mackerel contain these healthy fatty acids. So do ground flaxseeds and flaxseed oil, soybeans, walnuts, and seeds. Why cut down on unhealthy fats? Eating foods that contain saturated fats can raise the LDL (\"bad\") cholesterol in your blood. Having a high level of LDL cholesterol increases your chance of hardening of the arteries (atherosclerosis), which can lead to heart disease, heart attack, and stroke. Trans fat raises the level of \"bad\" LDL cholesterol in your blood and may lower the \"good\" HDL cholesterol in your blood. Trans fat can raise your risk of heart disease, heart attack, and stroke. In general: · No more than 10% of your daily calories should come from saturated fat. This is about 20 grams in a 2,000-calorie diet. · No more than 10% of your daily calories should come from polyunsaturated fat. This is about 20 grams in a 2,000-calorie diet. · Monounsaturated fats can be up to 15% of your daily calories. This is about 25 to 30 grams in a 2,000-calorie diet. If you're not sure how much fat you should be eating or how many calories you need each day to stay at a healthy weight, talk to a registered dietitian. He or she can help you create a plan that's right for you. What can you do to cut down on fat? Foods like cheese, butter, sausage, and desserts can have a lot of unhealthy fats. Try these tips for healthier meals at home and when you eat out. At home · Fill up on fruits, vegetables, and whole grains. · Think of meat as a side dish instead of as the main part of your meal. 
· When you do eat meat, make it extra-lean ground beef (97% lean), ground turkey breast (without skin added), meats with fat trimmed off before cooking, or skinless chicken. · Try main dishes that use whole wheat pasta, brown rice, dried beans, or vegetables. · Use cooking methods that use little or no fat, such as broiling, steaming, or grilling. Use cooking spray instead of oil. If you use oil, use a monounsaturated oil, such as canola or olive oil. · Read food labels on canned, bottled, or packaged foods. Choose those with little saturated fat and no trans fat. When eating out at a restaurant · Order foods that are broiled or poached instead of fried or breaded. · Cut back on the amount of butter or margarine that you use on bread. Use small amounts of olive oil instead. · Order sauces, gravies, and salad dressings on the side, and use only a little. · When you order pasta, choose tomato sauce instead of cream sauce. · Ask for salsa with your baked potato instead of sour cream, butter, cheese, or alvarez. Where can you learn more? Go to http://www.gray.com/ Enter D790 in the search box to learn more about \"Learning About Low-Fat Eating. \" Current as of: August 22, 2019               Content Version: 12.6 © 0315-0798 [employer]. Care instructions adapted under license by [employer] (which disclaims liability or warranty for this information). If you have questions about a medical condition or this instruction, always ask your healthcare professional. [address] any warranty or liability for your use of this information. Low Sodium Diet (2,000 Milligram): Care Instructions Your Care Instructions Too much sodium causes your body to hold on to extra water. This can raise your blood pressure and force your heart and kidneys to work harder. In very serious cases, this could cause you to be put in the hospital. It might even be life-threatening. By limiting sodium, you will feel better and lower your risk of serious problems. The most common source of sodium is salt. People get most of the salt in their diet from canned, prepared, and packaged foods. Fast food and restaurant meals also are very high in sodium. Your doctor will probably limit your sodium to less than 2,000 milligrams (mg) a day. This limit counts all the sodium in prepared and packaged foods and any salt you add to your food. Follow-up care is a key part of your treatment and safety. Be sure to make and go to all appointments, and call your doctor if you are having problems. It's also a good idea to know your test results and keep a list of the medicines you take. How can you care for yourself at home? Read food labels · Read labels on cans and food packages. The labels tell you how much sodium is in each serving. Make sure that you look at the serving size. If you eat more than the serving size, you have eaten more sodium. · Food labels also tell you the Percent Daily Value for sodium. Choose products with low Percent Daily Values for sodium. · Be aware that sodium can come in forms other than salt, including monosodium glutamate (MSG), sodium citrate, and sodium bicarbonate (baking soda). MSG is often added to Asian food. When you eat out, you can sometimes ask for food without MSG or added salt. Buy low-sodium foods · Buy foods that are labeled \"unsalted\" (no salt added), \"sodium-free\" (less than 5 mg of sodium per serving), or \"low-sodium\" (less than 140 mg of sodium per serving). Foods labeled \"reduced-sodium\" and \"light sodium\" may still have too much sodium. Be sure to read the label to see how much sodium you are getting. · Buy fresh vegetables, or frozen vegetables without added sauces. Buy low-sodium versions of canned vegetables, soups, and other canned goods. Prepare low-sodium meals · Cut back on the amount of salt you use in cooking. This will help you adjust to the taste. Do not add salt after cooking. One teaspoon of salt has about 2,300 mg of sodium. · Take the salt shaker off the table. · Flavor your food with garlic, lemon juice, onion, vinegar, herbs, and spices. Do not use soy sauce, lite soy sauce, steak sauce, onion salt, garlic salt, celery salt, mustard, or ketchup on your food. · Use low-sodium salad dressings, sauces, and ketchup. Or make your own salad dressings and sauces without adding salt. · Use less salt (or none) when recipes call for it. You can often use half the salt a recipe calls for without losing flavor. Other foods such as rice, pasta, and grains do not need added salt. · Rinse canned vegetables, and cook them in fresh water. This removes somebut not allof the salt. · Avoid water that is naturally high in sodium or that has been treated with water softeners, which add sodium. Call your local water company to find out the sodium content of your water supply. If you buy bottled water, read the label and choose a sodium-free brand. Avoid high-sodium foods · Avoid eating: 
? Smoked, cured, salted, and canned meat, fish, and poultry. ? Ham, alvarez, hot dogs, and luncheon meats. ? Regular, hard, and processed cheese and regular peanut butter. ? Crackers with salted tops, and other salted snack foods such as pretzels, chips, and salted popcorn. ? Frozen prepared meals, unless labeled low-sodium. ? Canned and dried soups, broths, and bouillon, unless labeled sodium-free or low-sodium. ? Canned vegetables, unless labeled sodium-free or low-sodium. ? [employer] fries, pizza, tacos, and other fast foods. ? Pickles, olives, ketchup, and other condiments, especially soy sauce, unless labeled sodium-free or low-sodium. Where can you learn more? Go to http://www.[employer]/ Enter V825 in the search box to learn more about \"Low Sodium Diet (2,000 Milligram): Care Instructions. \" Current as of: August 22, 2019               Content Version: 12.6 © 4125-2368 [employer]. Care instructions adapted under license by [employer] (which disclaims liability or warranty for this information). If you have questions about a medical condition or this instruction, always ask your healthcare professional. [address] any warranty or liability for your use of this information.

## 2021-01-16 RX ORDER — VALSARTAN AND HYDROCHLOROTHIAZIDE 320; 12.5 MG/1; MG/1
TABLET, FILM COATED ORAL
Qty: 90 TAB | Refills: 0 | Status: SHIPPED | OUTPATIENT
Start: 2021-01-16 | End: 2021-12-22 | Stop reason: ALTCHOICE

## 2021-01-16 RX ORDER — AMLODIPINE BESYLATE 10 MG/1
TABLET ORAL
Qty: 30 TAB | Refills: 0 | Status: SHIPPED | OUTPATIENT
Start: 2021-01-16 | End: 2021-03-01

## 2021-01-18 ENCOUNTER — TELEPHONE (OUTPATIENT)
Dept: FAMILY MEDICINE CLINIC | Age: 56
End: 2021-01-18

## 2021-03-01 RX ORDER — AMLODIPINE BESYLATE 10 MG/1
TABLET ORAL
Qty: 30 TAB | Refills: 0 | Status: SHIPPED | OUTPATIENT
Start: 2021-03-01 | End: 2021-04-13

## 2021-04-13 RX ORDER — AMLODIPINE BESYLATE 10 MG/1
TABLET ORAL
Qty: 30 TAB | Refills: 0 | Status: SHIPPED | OUTPATIENT
Start: 2021-04-13

## 2021-04-15 ENCOUNTER — OFFICE VISIT (OUTPATIENT)
Dept: FAMILY MEDICINE CLINIC | Age: 56
End: 2021-04-15
Payer: COMMERCIAL

## 2021-04-15 VITALS
HEIGHT: 69 IN | DIASTOLIC BLOOD PRESSURE: 84 MMHG | TEMPERATURE: 98.4 F | HEART RATE: 85 BPM | SYSTOLIC BLOOD PRESSURE: 122 MMHG | BODY MASS INDEX: 39.25 KG/M2 | RESPIRATION RATE: 18 BRPM | WEIGHT: 265 LBS | OXYGEN SATURATION: 99 %

## 2021-04-15 DIAGNOSIS — E78.3 MIXED HYPERGLYCERIDEMIA: ICD-10-CM

## 2021-04-15 DIAGNOSIS — E66.01 SEVERE OBESITY (HCC): ICD-10-CM

## 2021-04-15 DIAGNOSIS — Z79.4 CONTROLLED TYPE 2 DIABETES MELLITUS WITHOUT COMPLICATION, WITH LONG-TERM CURRENT USE OF INSULIN (HCC): ICD-10-CM

## 2021-04-15 DIAGNOSIS — E11.9 CONTROLLED TYPE 2 DIABETES MELLITUS WITHOUT COMPLICATION, WITH LONG-TERM CURRENT USE OF INSULIN (HCC): ICD-10-CM

## 2021-04-15 DIAGNOSIS — I11.9 MALIGNANT HYPERTENSIVE HEART DISEASE WITHOUT HEART FAILURE: Primary | ICD-10-CM

## 2021-04-15 PROCEDURE — 99214 OFFICE O/P EST MOD 30 MIN: CPT | Performed by: FAMILY MEDICINE

## 2021-04-15 NOTE — PROGRESS NOTES
Gama Beltran is a 50 y.o. male and presents with Follow-up (3 month)  . HPI     Subjective:  Cardiovascular Review:  The patient has hypertension   Diet and Lifestyle: generally follows a low fat low cholesterol diet, generally follows a low sodium diet, exercises sporadically  Home BP Monitoring: is not measured at home. Pertinent ROS: taking medications as instructed, no medication side effects noted, no TIA's, no chest pain on exertion, no dyspnea on exertion, no swelling of ankles. Review of Systems  Review of Systems   Constitutional: Negative. Negative for chills and fever. HENT: Negative. Negative for congestion, ear discharge, hearing loss, nosebleeds and tinnitus. Eyes: Negative. Negative for blurred vision, double vision, photophobia and pain. Respiratory: Negative. Negative for cough, hemoptysis and sputum production. Cardiovascular: Negative. Negative for chest pain and palpitations. Gastrointestinal: Negative. Negative for heartburn, nausea and vomiting. Genitourinary: Negative. Negative for dysuria, frequency and urgency. Musculoskeletal: Negative. Negative for back pain and myalgias. Skin: Negative. Neurological: Negative. Negative for dizziness, tingling, weakness and headaches. Endo/Heme/Allergies: Negative. Psychiatric/Behavioral: Negative. Negative for depression and suicidal ideas. The patient does not have insomnia. All other systems reviewed and are negative.         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    Elevated blood pressure reading 9/2/2020    Hyperglycemia 9/2/2020    Hyperlipidemia 8/19/2011    Lumbago with sciatica 9/2/2020    Lymphadenopathy 9/2/2020    Sprain of shoulder 9/2/2020     Past Surgical History:   Procedure Laterality Date  HX ACL RECONSTRUCTION      left knee    HX ORTHOPAEDIC       Social History     Socioeconomic History    Marital status:      Spouse name: Not on file    Number of children: Not on file    Years of education: Not on file    Highest education level: Not on file   Tobacco Use    Smoking status: Never Smoker    Smokeless tobacco: Never Used   Substance and Sexual Activity    Alcohol use: No    Drug use: No    Sexual activity: Yes     Partners: Female     Family History   Problem Relation Age of Onset    Heart Disease Mother     Hypertension Mother     Diabetes Father     Hypertension Father     Hypertension Sister     Hypertension Brother        Allergies   Allergen Reactions    Codeine Other (comments)     Pt stated that it makes him \"crazy\"-not aware of actions.  Trulicity [Dulaglutide] Nausea and Vomiting       Objective:  Visit Vitals  /84   Pulse 85   Temp 98.4 °F (36.9 °C) (Oral)   Resp 18   Ht 5' 9\" (1.753 m)   Wt 265 lb (120.2 kg)   SpO2 99%   BMI 39.13 kg/m²       Physical Exam:   Physical Exam  Vitals signs and nursing note reviewed. Constitutional:       General: He is not in acute distress. Appearance: Normal appearance. He is obese. He is not ill-appearing, toxic-appearing or diaphoretic. HENT:      Head: Normocephalic and atraumatic. Right Ear: Tympanic membrane, ear canal and external ear normal. There is no impacted cerumen. Left Ear: Tympanic membrane, ear canal and external ear normal. There is no impacted cerumen. Nose: Nose normal. No congestion or rhinorrhea. Mouth/Throat:      Mouth: Mucous membranes are moist.      Pharynx: Oropharynx is clear. No oropharyngeal exudate or posterior oropharyngeal erythema. Eyes:      General: No scleral icterus. Right eye: No discharge. Left eye: No discharge. Extraocular Movements: Extraocular movements intact.       Conjunctiva/sclera: Conjunctivae normal.      Pupils: Pupils are equal, round, and reactive to light. Neck:      Musculoskeletal: Normal range of motion and neck supple. No neck rigidity or muscular tenderness. Vascular: No carotid bruit. Cardiovascular:      Rate and Rhythm: Normal rate and regular rhythm. Pulses: Normal pulses. Heart sounds: Normal heart sounds. No murmur. No friction rub. No gallop. Pulmonary:      Effort: Pulmonary effort is normal. No respiratory distress. Breath sounds: Normal breath sounds. No stridor. No wheezing, rhonchi or rales. Chest:      Chest wall: No tenderness. Abdominal:      General: Abdomen is flat. Bowel sounds are normal. There is no distension. Palpations: Abdomen is soft. There is no mass. Tenderness: There is no abdominal tenderness. There is no right CVA tenderness, left CVA tenderness, guarding or rebound. Hernia: No hernia is present. Musculoskeletal: Normal range of motion. General: No swelling, tenderness, deformity or signs of injury. Right lower leg: No edema. Left lower leg: No edema. Lymphadenopathy:      Cervical: No cervical adenopathy. Skin:     General: Skin is warm. Capillary Refill: Capillary refill takes 2 to 3 seconds. Coloration: Skin is not jaundiced or pale. Findings: No bruising, erythema, lesion or rash. Neurological:      General: No focal deficit present. Mental Status: He is alert and oriented to person, place, and time. Mental status is at baseline. Cranial Nerves: No cranial nerve deficit. Sensory: No sensory deficit. Motor: No weakness. Coordination: Coordination normal.      Gait: Gait normal.      Deep Tendon Reflexes: Reflexes normal.   Psychiatric:         Mood and Affect: Mood normal.         Behavior: Behavior normal.         Thought Content:  Thought content normal.         Judgment: Judgment normal.             Results for orders placed or performed in visit on 87/97/01   METABOLIC PANEL, COMPREHENSIVE   Result Value Ref Range    Glucose 206 (H) 65 - 99 mg/dL    BUN 11 6 - 24 mg/dL    Creatinine 1.00 0.76 - 1.27 mg/dL    GFR est non-AA 86 >59 mL/min/1.73    GFR est  >59 mL/min/1.73    BUN/Creatinine ratio 11 9 - 20    Sodium 139 134 - 144 mmol/L    Potassium 4.3 3.5 - 5.2 mmol/L    Chloride 102 96 - 106 mmol/L    CO2 24 20 - 29 mmol/L    Calcium 9.7 8.7 - 10.2 mg/dL    Protein, total 8.0 6.0 - 8.5 g/dL    Albumin 4.6 3.8 - 4.9 g/dL    GLOBULIN, TOTAL 3.4 1.5 - 4.5 g/dL    A-G Ratio 1.4 1.2 - 2.2    Bilirubin, total 1.5 (H) 0.0 - 1.2 mg/dL    Alk. phosphatase 109 39 - 117 IU/L    AST (SGOT) 31 0 - 40 IU/L    ALT (SGPT) 45 (H) 0 - 44 IU/L   LIPID PANEL   Result Value Ref Range    Cholesterol, total 157 100 - 199 mg/dL    Triglyceride 69 0 - 149 mg/dL    HDL Cholesterol 51 >39 mg/dL    VLDL, calculated 14 5 - 40 mg/dL    LDL, calculated 92 0 - 99 mg/dL   MICROALBUMIN, UR, RAND W/ MICROALB/CREAT RATIO   Result Value Ref Range    Creatinine, urine 49.0 Not Estab. mg/dL    Microalbumin, urine 6.7 Not Estab. ug/mL    Microalb/Creat ratio (ug/mg creat.) 14 0 - 29 mg/g creat   CVD REPORT   Result Value Ref Range    INTERPRETATION Note    AMB POC HEMOGLOBIN A1C   Result Value Ref Range    Hemoglobin A1c (POC) 7.6 %       Assessment/Plan:    ICD-10-CM ICD-9-CM    1. Malignant hypertensive heart disease without heart failure  I11.9 402.00    2. Controlled type 2 diabetes mellitus without complication, with long-term current use of insulin (HCC)  E11.9 250.00     Z79.4 V58.67    3. Mixed hyperglyceridemia  E78.3 272.3    4. Severe obesity (HCC)  E66.01 278.01     Pt to stop soda and high caloric drinks and ffods     No orders of the defined types were placed in this encounter. Cannot display discharge medications since this is not an admission.

## 2021-04-15 NOTE — PATIENT INSTRUCTIONS
Learning About Low-Fat Eating What is low-fat eating? Most food has some fat in it. Your body needs some fat to be healthy. But some kinds of fats are healthier than others. In a low-fat eating plan, you try to choose healthier fats and eat fewer unhealthy fats. Healthy fats include olive and canola oil. Try to avoid eating too much saturated fat, such as in cheese and meats. You do not need to cut all fat from your diet. But you can make healthier choices about the types and amount of fat you eat. Even though it is a good idea to choose healthier fats, it is still important to be careful of how much fat you eat, because all fats are high in calories. What are the different types of fats? Unhealthy fat · Saturated fat. Saturated fats are mostly in animal foods, such as meat and dairy foods. Tropical oils, such as coconut oil, palm oil, and cocoa butter, are also saturated fats. Healthy fats · Monounsaturated fat. Monounsaturated fats are liquid at room temperature but get solid when refrigerated. Eating foods that are high in this fat may help lower your \"bad\" (LDL) cholesterol, keep your \"good\" (HDL) cholesterol level up, and lower your chances of getting coronary artery disease. This fat is found in canola oil, olive oil, peanut oil, olives, avocados, nuts, and nut butters. · Polyunsaturated fat. Polyunsaturated fats are liquid at room temperature. They are in safflower, sunflower, and corn oils. They are also the main fat in seafood. Omega-3 fatty acids are types of polyunsaturated fat. Eating fish may lower your chances of getting coronary artery disease. Fatty fish such as salmon and mackerel contain these healthy fatty acids. So do ground flaxseeds and flaxseed oil, soybeans, walnuts, and seeds. Why cut down on unhealthy fats? Eating foods that contain saturated fats can raise the LDL (\"bad\") cholesterol in your blood.  Having a high level of LDL cholesterol increases your chance of hardening of the arteries (atherosclerosis), which can lead to heart disease, heart attack, and stroke. In general: · No more than 10% of your daily calories should come from saturated fat. This is about 20 grams in a 2,000-calorie diet. · No more than 10% of your daily calories should come from polyunsaturated fat. This is about 20 grams in a 2,000-calorie diet. · Monounsaturated fats can be up to 15% of your daily calories. This is about 25 to 30 grams in a 2,000-calorie diet. If you're not sure how much fat you should be eating or how many calories you need each day to stay at a healthy weight, talk to a registered dietitian. A dietitian can help you create a plan that's right for you. What can you do to cut down on fat? Foods like cheese, butter, sausage, and desserts can have a lot of unhealthy fats. Try these tips for healthier meals at home and when you eat out. At home · Fill up on fruits, vegetables, and whole grains. · Think of meat as a side dish instead of as the main part of your meal. 
· When you do eat meat, make it extra-lean ground beef (97% lean), ground turkey breast (without skin added), meats with fat trimmed off before cooking, or skinless chicken. · Try main dishes that use whole wheat pasta, brown rice, dried beans, or vegetables. · Use cooking methods that use little or no fat, such as broiling, steaming, or grilling. Use cooking spray instead of oil. If you use oil, use a monounsaturated oil, such as canola or olive oil. · Read food labels on canned, bottled, or packaged foods. Choose those with little saturated fat. When eating out at a restaurant · Order foods that are broiled or poached instead of fried or breaded. · Cut back on the amount of butter or margarine that you use on bread. Use small amounts of olive oil instead. · Order sauces, gravies, and salad dressings on the side, and use only a little. · When you order pasta, choose tomato sauce instead of cream sauce. · Ask for salsa with your baked potato instead of sour cream, butter, cheese, or alvarez. Where can you learn more? Go to http://www.gray.com/ Enter H487 in the search box to learn more about \"Learning About Low-Fat Eating. \" Current as of: December 17, 2020               Content Version: 12.8 © 2006-2021 [employer]. Care instructions adapted under license by [employer] (which disclaims liability or warranty for this information). If you have questions about a medical condition or this instruction, always ask your healthcare professional. [place] [address] any warranty or liability for your use of this information. Low Sodium Diet (2,000 Milligram): Care Instructions Overview Limiting sodium can be an important part of managing some health problems. The most common source of sodium is salt. People get most of the salt in their diet from canned, prepared, and packaged foods. Fast food and restaurant meals also are very high in sodium. Your doctor will probably limit your sodium to less than 2,000 milligrams (mg) a day. This limit counts all the sodium in prepared and packaged foods and any salt you add to your food. Follow-up care is a key part of your treatment and safety. Be sure to make and go to all appointments, and call your doctor if you are having problems. It's also a good idea to know your test results and keep a list of the medicines you take. How can you care for yourself at home? Read food labels · Read labels on cans and food packages. The labels tell you how much sodium is in each serving. Make sure that you look at the serving size. If you eat more than the serving size, you have eaten more sodium. · Food labels also tell you the Percent Daily Value for sodium. Choose products with low Percent Daily Values for sodium.  
· Be aware that sodium can come in forms other than salt, including monosodium glutamate (MSG), sodium citrate, and sodium bicarbonate (baking soda). MSG is often added to Asian food. When you eat out, you can sometimes ask for food without MSG or added salt. Buy low-sodium foods · Buy foods that are labeled \"unsalted\" (no salt added), \"sodium-free\" (less than 5 mg of sodium per serving), or \"low-sodium\" (140 mg or less of sodium per serving). Foods labeled \"reduced-sodium\" and \"light sodium\" may still have too much sodium. Be sure to read the label to see how much sodium you are getting. · Buy fresh vegetables, or frozen vegetables without added sauces. Buy low-sodium versions of canned vegetables, soups, and other canned goods. Prepare low-sodium meals · Cut back on the amount of salt you use in cooking. This will help you adjust to the taste. Do not add salt after cooking. One teaspoon of salt has about 2,300 mg of sodium. · Take the salt shaker off the table. · Flavor your food with garlic, lemon juice, onion, vinegar, herbs, and spices. Do not use soy sauce, lite soy sauce, steak sauce, onion salt, garlic salt, celery salt, or ketchup on your food. · Use low-sodium salad dressings, sauces, and ketchup. Or make your own salad dressings and sauces without adding salt. · Use less salt (or none) when recipes call for it. You can often use half the salt a recipe calls for without losing flavor. Other foods such as rice, pasta, and grains do not need added salt. · Rinse canned vegetables, and cook them in fresh water. This removes somebut not allof the salt. · Avoid water that is naturally high in sodium or that has been treated with water softeners, which add sodium. If you buy bottled water, read the label and choose a sodium-free brand. Avoid high-sodium foods · Avoid eating: 
? Smoked, cured, salted, and canned meat, fish, and poultry. ? Ham, alvarez, hot dogs, and luncheon meats. ? Regular, hard, and processed cheese and regular peanut butter.  
? Crackers with salted tops, and other salted snack foods such as pretzels, chips, and salted popcorn. ? Frozen prepared meals, unless labeled low-sodium. ? Canned and dried soups, broths, and bouillon, unless labeled sodium-free or low-sodium. ? Canned vegetables, unless labeled sodium-free or low-sodium. ? [employer] fries, pizza, tacos, and other fast foods. ? Pickles, olives, ketchup, and other condiments, especially soy sauce, unless labeled sodium-free or low-sodium. Where can you learn more? Go to http://www.gray.com/ Enter T645 in the search box to learn more about \"Low Sodium Diet (2,000 Milligram): Care Instructions. \" Current as of: December 17, 2020               Content Version: 12.8 © 2959-3986 [employer]. Care instructions adapted under license by [employer] (which disclaims liability or warranty for this information). If you have questions about a medical condition or this instruction, always ask your healthcare professional. [place] [address] any warranty or liability for your use of this information.

## 2021-04-15 NOTE — PROGRESS NOTES
Yamile Guevara is a 50 y.o. male  Chief Complaint   Patient presents with    Follow-up     3 month     Health Maintenance Due   Topic Date Due    Hepatitis C Screening  Never done    Pneumococcal 0-64 years (1 of 1 - PPSV23) Never done    Foot Exam Q1  Never done    Eye Exam Retinal or Dilated  Never done    COVID-19 Vaccine (1) Never done    DTaP/Tdap/Td series (1 - Tdap) Never done    Shingrix Vaccine Age 50> (1 of 2) Never done     Visit Vitals  /84   Pulse 85   Temp 98.4 °F (36.9 °C) (Oral)   Resp 18   Ht 5' 9\" (1.753 m)   Wt 265 lb (120.2 kg)   SpO2 99%   BMI 39.13 kg/m²     1. Have you been to the ER, urgent care clinic since your last visit? Hospitalized since your last visit? No     2. Have you seen or consulted any other health care providers outside of the [address] [place] [address] since your last visit? Include any pap smears or colon screening. No     Patients blood sugar was 169.

## 2021-05-11 ENCOUNTER — TELEPHONE (OUTPATIENT)
Dept: FAMILY MEDICINE CLINIC | Age: 56
End: 2021-05-11

## 2021-05-12 RX ORDER — IBUPROFEN 600 MG/1
600 TABLET ORAL
Qty: 30 TAB | Refills: 0 | Status: SHIPPED | OUTPATIENT
Start: 2021-05-12 | End: 2021-06-11

## 2021-07-19 ENCOUNTER — OFFICE VISIT (OUTPATIENT)
Dept: FAMILY MEDICINE CLINIC | Age: 56
End: 2021-07-19
Payer: COMMERCIAL

## 2021-07-19 VITALS
RESPIRATION RATE: 14 BRPM | OXYGEN SATURATION: 96 % | WEIGHT: 266.2 LBS | TEMPERATURE: 97.5 F | HEIGHT: 69 IN | HEART RATE: 88 BPM | DIASTOLIC BLOOD PRESSURE: 78 MMHG | BODY MASS INDEX: 39.43 KG/M2 | SYSTOLIC BLOOD PRESSURE: 118 MMHG

## 2021-07-19 DIAGNOSIS — Z79.4 CONTROLLED TYPE 2 DIABETES MELLITUS WITHOUT COMPLICATION, WITH LONG-TERM CURRENT USE OF INSULIN (HCC): ICD-10-CM

## 2021-07-19 DIAGNOSIS — E78.3 MIXED HYPERGLYCERIDEMIA: ICD-10-CM

## 2021-07-19 DIAGNOSIS — I11.9 MALIGNANT HYPERTENSIVE HEART DISEASE WITHOUT HEART FAILURE: Primary | ICD-10-CM

## 2021-07-19 DIAGNOSIS — E66.01 SEVERE OBESITY (HCC): ICD-10-CM

## 2021-07-19 DIAGNOSIS — E11.9 CONTROLLED TYPE 2 DIABETES MELLITUS WITHOUT COMPLICATION, WITH LONG-TERM CURRENT USE OF INSULIN (HCC): ICD-10-CM

## 2021-07-19 PROCEDURE — 99214 OFFICE O/P EST MOD 30 MIN: CPT | Performed by: FAMILY MEDICINE

## 2021-07-19 NOTE — PROGRESS NOTES
Verónica Chandler is a 50 y.o. male and presents with Follow-up  . HPI     Subjective:  Cardiovascular Review:  The patient has hypertension   Diet and Lifestyle: generally follows a low fat low cholesterol diet, generally follows a low sodium diet, exercises sporadically  Home BP Monitoring: is not measured at home. Pertinent ROS: taking medications as instructed, no medication side effects noted, no TIA's, no chest pain on exertion, no dyspnea on exertion, no swelling of ankles. Review of Systems  Review of Systems   Constitutional: Negative. Negative for chills and fever. HENT: Negative. Negative for congestion, ear discharge, hearing loss, nosebleeds and tinnitus. Eyes: Negative. Negative for blurred vision, double vision, photophobia and pain. Respiratory: Negative. Negative for cough, hemoptysis and sputum production. Cardiovascular: Negative. Negative for chest pain and palpitations. Gastrointestinal: Negative. Negative for heartburn, nausea and vomiting. Genitourinary: Negative. Negative for dysuria, frequency and urgency. Musculoskeletal: Negative. Negative for back pain and myalgias. Skin: Negative. Neurological: Negative. Negative for dizziness, tingling, weakness and headaches. Endo/Heme/Allergies: Negative. Psychiatric/Behavioral: Negative. Negative for depression and suicidal ideas. The patient does not have insomnia. All other systems reviewed and are negative.         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    Elevated blood pressure reading 9/2/2020    Hyperglycemia 9/2/2020    Hyperlipidemia 8/19/2011    Lumbago with sciatica 9/2/2020    Lymphadenopathy 9/2/2020    Sprain of shoulder 9/2/2020     Past Surgical History:   Procedure Laterality Date    HX ACL RECONSTRUCTION      left knee    HX ORTHOPAEDIC       Social History     Socioeconomic History    Marital status:      Spouse name: Not on file    Number of children: Not on file    Years of education: Not on file    Highest education level: Not on file   Tobacco Use    Smoking status: Never Smoker    Smokeless tobacco: Never Used   Substance and Sexual Activity    Alcohol use: No    Drug use: No    Sexual activity: Yes     Partners: Female     Social Determinants of Health     Financial Resource Strain:     Difficulty of Paying Living Expenses:    Food Insecurity:     Worried About Running Out of Food in the Last Year:     [address] in the Last Year:    Transportation Needs:     Lack of Transportation (Medical):  Lack of Transportation (Non-Medical):    Physical Activity:     Days of Exercise per Week:     Minutes of Exercise per Session:    Stress:     Feeling of Stress :    Social Connections:     Frequency of Communication with Friends and Family:     Frequency of Social Gatherings with Friends and Family:     Attends Latter-[employer] Services:     Active Member of Clubs or Organizations:     Attends Club or Organization Meetings:     Marital Status:      Family History   Problem Relation Age of Onset    Heart Disease Mother     Hypertension Mother     Diabetes Father     Hypertension Father     Hypertension Sister     Hypertension Brother        Allergies   Allergen Reactions    Codeine Other (comments)     Pt stated that it makes him \"crazy\"-not aware of actions.  Trulicity [Dulaglutide] Nausea and Vomiting       Objective:  Visit Vitals  /78 (BP 1 Location: Left upper arm, BP Patient Position: Sitting)   Pulse 88   Temp 97.5 °F (36.4 °C) (Temporal)   Resp 14   Ht 5' 9\" (1.753 m)   Wt 266 lb 3.2 oz (120.7 kg)   SpO2 96%   BMI 39.31 kg/m²       Physical Exam:   Physical Exam  Vitals and nursing note reviewed.    Constitutional:       General: He is not in acute distress. Appearance: Normal appearance. He is obese. He is not ill-appearing, toxic-appearing or diaphoretic. HENT:      Head: Normocephalic and atraumatic. Right Ear: Tympanic membrane, ear canal and external ear normal. There is no impacted cerumen. Left Ear: Tympanic membrane, ear canal and external ear normal. There is no impacted cerumen. Nose: Nose normal. No congestion or rhinorrhea. Mouth/Throat:      Mouth: Mucous membranes are moist.      Pharynx: Oropharynx is clear. No oropharyngeal exudate or posterior oropharyngeal erythema. Eyes:      General: No scleral icterus. Right eye: No discharge. Left eye: No discharge. Extraocular Movements: Extraocular movements intact. Conjunctiva/sclera: Conjunctivae normal.      Pupils: Pupils are equal, round, and reactive to light. Neck:      Vascular: No carotid bruit. Cardiovascular:      Rate and Rhythm: Normal rate and regular rhythm. Pulses: Normal pulses. Heart sounds: Normal heart sounds. No murmur heard. No friction rub. No gallop. Pulmonary:      Effort: Pulmonary effort is normal. No respiratory distress. Breath sounds: Normal breath sounds. No stridor. No wheezing, rhonchi or rales. Chest:      Chest wall: No tenderness. Abdominal:      General: Abdomen is flat. Bowel sounds are normal. There is no distension. Palpations: Abdomen is soft. There is no mass. Tenderness: There is no abdominal tenderness. There is no right CVA tenderness, left CVA tenderness, guarding or rebound. Hernia: No hernia is present. Musculoskeletal:         General: No swelling, tenderness, deformity or signs of injury. Normal range of motion. Cervical back: Normal range of motion and neck supple. No rigidity. No muscular tenderness. Right lower leg: No edema. Left lower leg: No edema. Lymphadenopathy:      Cervical: No cervical adenopathy.    Skin:     General: Skin is warm.      Capillary Refill: Capillary refill takes 2 to 3 seconds. Coloration: Skin is not jaundiced or pale. Findings: No bruising, erythema, lesion or rash. Neurological:      General: No focal deficit present. Mental Status: He is alert and oriented to person, place, and time. Mental status is at baseline. Cranial Nerves: No cranial nerve deficit. Sensory: No sensory deficit. Motor: No weakness. Coordination: Coordination normal.      Gait: Gait normal.      Deep Tendon Reflexes: Reflexes normal.   Psychiatric:         Mood and Affect: Mood normal.         Behavior: Behavior normal.         Thought Content: Thought content normal.         Judgment: Judgment normal.             Results for orders placed or performed in visit on 40/76/76   METABOLIC PANEL, COMPREHENSIVE   Result Value Ref Range    Glucose 206 (H) 65 - 99 mg/dL    BUN 11 6 - 24 mg/dL    Creatinine 1.00 0.76 - 1.27 mg/dL    GFR est non-AA 86 >59 mL/min/1.73    GFR est  >59 mL/min/1.73    BUN/Creatinine ratio 11 9 - 20    Sodium 139 134 - 144 mmol/L    Potassium 4.3 3.5 - 5.2 mmol/L    Chloride 102 96 - 106 mmol/L    CO2 24 20 - 29 mmol/L    Calcium 9.7 8.7 - 10.2 mg/dL    Protein, total 8.0 6.0 - 8.5 g/dL    Albumin 4.6 3.8 - 4.9 g/dL    GLOBULIN, TOTAL 3.4 1.5 - 4.5 g/dL    A-G Ratio 1.4 1.2 - 2.2    Bilirubin, total 1.5 (H) 0.0 - 1.2 mg/dL    Alk.  phosphatase 109 39 - 117 IU/L    AST (SGOT) 31 0 - 40 IU/L    ALT (SGPT) 45 (H) 0 - 44 IU/L   LIPID PANEL   Result Value Ref Range    Cholesterol, total 157 100 - 199 mg/dL    Triglyceride 69 0 - 149 mg/dL    HDL Cholesterol 51 >39 mg/dL    VLDL, calculated 14 5 - 40 mg/dL    LDL, calculated 92 0 - 99 mg/dL   MICROALBUMIN, UR, RAND W/ MICROALB/CREAT RATIO   Result Value Ref Range    Creatinine, urine 49.0 Not Estab. mg/dL    Microalbumin, urine 6.7 Not Estab. ug/mL    Microalb/Creat ratio (ug/mg creat.) 14 0 - 29 mg/g creat   CVD REPORT   Result Value Ref Range INTERPRETATION Note    AMB POC HEMOGLOBIN A1C   Result Value Ref Range    Hemoglobin A1c (POC) 7.6 %       Assessment/Plan:    ICD-10-CM ICD-9-CM    1. Malignant hypertensive heart disease without heart failure  I11.9 402.00    2. Controlled type 2 diabetes mellitus without complication, with long-term current use of insulin (HCC)  E11.9 250.00     Z79.4 V58.67    3. Severe obesity ([facility] [address]  E66.01 278.01    4. Mixed hyperglyceridemia  E78.3 272.3      No orders of the defined types were placed in this encounter. Cannot display discharge medications since this is not an admission.

## 2021-07-19 NOTE — PROGRESS NOTES
Chief Complaint   Patient presents with    Follow-up     1. Have you been to the ER, urgent care clinic since your last visit? Hospitalized since your last visit? No    2. Have you seen or consulted any other health care providers outside of the [address] [place] [address] since your last visit? Include any pap smears or colon screening.  No

## 2021-08-03 ENCOUNTER — TELEPHONE (OUTPATIENT)
Dept: ENDOCRINOLOGY | Age: 56
End: 2021-08-03

## 2021-08-03 DIAGNOSIS — E11.65 TYPE 2 DIABETES MELLITUS WITH HYPERGLYCEMIA, WITHOUT LONG-TERM CURRENT USE OF INSULIN (HCC): Primary | ICD-10-CM

## 2021-08-03 RX ORDER — EXENATIDE 2 MG/.85ML
INJECTION, SUSPENSION, EXTENDED RELEASE SUBCUTANEOUS
Qty: 4 EACH | Refills: 3 | Status: SHIPPED | OUTPATIENT
Start: 2021-08-03 | End: 2021-12-22

## 2021-08-03 NOTE — TELEPHONE ENCOUNTER
Patient called stating that Bydureon is no longer covered.   Needs new medication sent to [facility]

## 2021-08-03 NOTE — TELEPHONE ENCOUNTER
PCP: Zulma Choudhary MD    Last appt: 3/25/2021  Future Appointments   Date Time Provider Tomasa Means   10/18/2021 10:00 AM Zulma Choudhary MD [facility] AMB       Requested Prescriptions     Pending Prescriptions Disp Refills    exenatide microspheres (Bydureon BCise) 2 mg/0.85 mL atIn       Sig: by SubCUTAneous route.

## 2021-08-09 ENCOUNTER — TELEPHONE (OUTPATIENT)
Dept: ENDOCRINOLOGY | Age: 56
End: 2021-08-09

## 2021-08-09 RX ORDER — BLOOD-GLUCOSE METER
EACH MISCELLANEOUS
Qty: 1 EACH | Refills: 0 | Status: SHIPPED | OUTPATIENT
Start: 2021-08-09 | End: 2022-10-10

## 2021-08-09 RX ORDER — LANCETS
EACH MISCELLANEOUS
Qty: 200 EACH | Refills: 3 | Status: SHIPPED | OUTPATIENT
Start: 2021-08-09 | End: 2021-12-22 | Stop reason: SDUPTHER

## 2021-08-09 RX ORDER — BLOOD SUGAR DIAGNOSTIC
STRIP MISCELLANEOUS
Qty: 200 STRIP | Refills: 3 | Status: SHIPPED | OUTPATIENT
Start: 2021-08-09 | End: 2022-10-10

## 2021-08-09 NOTE — TELEPHONE ENCOUNTER
----- Message from Cliff Guadarrama sent at 8/9/2021  9:07 AM EDT -----  Regarding: Dr. Koby Capone  Pt is stating that his insurance company has denied the Insulin again and he will need another alternative Rx. Pt is also requesting a new \"Meter\", and would like the orders sent to [facility] [employer] (on file). Pt's best contact number [phone].

## 2021-08-10 RX ORDER — BLOOD SUGAR DIAGNOSTIC
STRIP MISCELLANEOUS
Qty: 200 STRIP | Refills: 3 | Status: SHIPPED | OUTPATIENT
Start: 2021-08-10 | End: 2021-12-22 | Stop reason: SDUPTHER

## 2021-08-10 RX ORDER — BLOOD-GLUCOSE METER
EACH MISCELLANEOUS
Qty: 1 EACH | Refills: 0 | Status: SHIPPED | OUTPATIENT
Start: 2021-08-10 | End: 2021-12-22 | Stop reason: SDUPTHER

## 2021-08-10 NOTE — TELEPHONE ENCOUNTER
Did his insurance change  ?    Has not made any follow up appt     If he follows up then we can look into details  why they rejected     Fadumo nath Is out of market   B-cise is covered only by commercial plans - savings card is imp   Ask for deductible  if high     So, does he have medicare or medicaid

## 2021-10-22 ENCOUNTER — PATIENT MESSAGE (OUTPATIENT)
Dept: FAMILY MEDICINE CLINIC | Age: 56
End: 2021-10-22

## 2021-11-26 RX ORDER — SITAGLIPTIN AND METFORMIN HYDROCHLORIDE 50; 1000 MG/1; MG/1
TABLET, FILM COATED, EXTENDED RELEASE ORAL
Qty: 60 TABLET | Refills: 0 | Status: SHIPPED | OUTPATIENT
Start: 2021-11-26 | End: 2021-12-22 | Stop reason: SDUPTHER

## 2021-12-22 ENCOUNTER — OFFICE VISIT (OUTPATIENT)
Dept: ENDOCRINOLOGY | Age: 56
End: 2021-12-22
Payer: COMMERCIAL

## 2021-12-22 VITALS
TEMPERATURE: 98.1 F | DIASTOLIC BLOOD PRESSURE: 96 MMHG | HEART RATE: 86 BPM | BODY MASS INDEX: 39.96 KG/M2 | RESPIRATION RATE: 18 BRPM | SYSTOLIC BLOOD PRESSURE: 142 MMHG | WEIGHT: 269.8 LBS | OXYGEN SATURATION: 96 % | HEIGHT: 69 IN

## 2021-12-22 DIAGNOSIS — E11.65 TYPE 2 DIABETES MELLITUS WITH HYPERGLYCEMIA, WITHOUT LONG-TERM CURRENT USE OF INSULIN (HCC): ICD-10-CM

## 2021-12-22 DIAGNOSIS — E11.65 TYPE 2 DIABETES MELLITUS WITH HYPERGLYCEMIA, WITHOUT LONG-TERM CURRENT USE OF INSULIN (HCC): Primary | ICD-10-CM

## 2021-12-22 DIAGNOSIS — E78.2 MIXED HYPERLIPIDEMIA: ICD-10-CM

## 2021-12-22 DIAGNOSIS — I10 ESSENTIAL HYPERTENSION: ICD-10-CM

## 2021-12-22 DIAGNOSIS — Z91.14 NON COMPLIANCE W MEDICATION REGIMEN: ICD-10-CM

## 2021-12-22 DIAGNOSIS — E29.1 HYPOGONADISM MALE: ICD-10-CM

## 2021-12-22 LAB — HBA1C MFR BLD HPLC: 8.7 %

## 2021-12-22 PROCEDURE — 83036 HEMOGLOBIN GLYCOSYLATED A1C: CPT | Performed by: INTERNAL MEDICINE

## 2021-12-22 PROCEDURE — 99215 OFFICE O/P EST HI 40 MIN: CPT | Performed by: INTERNAL MEDICINE

## 2021-12-22 PROCEDURE — 3052F HG A1C>EQUAL 8.0%<EQUAL 9.0%: CPT | Performed by: INTERNAL MEDICINE

## 2021-12-22 RX ORDER — BLOOD SUGAR DIAGNOSTIC
STRIP MISCELLANEOUS
Qty: 200 STRIP | Refills: 3 | Status: SHIPPED | OUTPATIENT
Start: 2021-12-22 | End: 2022-05-13

## 2021-12-22 RX ORDER — SITAGLIPTIN AND METFORMIN HYDROCHLORIDE 50; 1000 MG/1; MG/1
TABLET, FILM COATED, EXTENDED RELEASE ORAL
Qty: 60 TABLET | Refills: 6 | Status: SHIPPED | OUTPATIENT
Start: 2021-12-22 | End: 2022-05-13 | Stop reason: SDUPTHER

## 2021-12-22 RX ORDER — SEMAGLUTIDE 1.34 MG/ML
INJECTION, SOLUTION SUBCUTANEOUS
Qty: 1 BOX | Refills: 0 | Status: SHIPPED | COMMUNITY
Start: 2021-12-22 | End: 2022-10-10

## 2021-12-22 RX ORDER — PERPHENAZINE 16 MG
TABLET ORAL
COMMUNITY

## 2021-12-22 RX ORDER — DULAGLUTIDE 1.5 MG/.5ML
1.5 INJECTION, SOLUTION SUBCUTANEOUS
Qty: 2 ML | Refills: 0 | Status: SHIPPED | OUTPATIENT
Start: 2021-12-22 | End: 2021-12-22

## 2021-12-22 RX ORDER — LOSARTAN POTASSIUM AND HYDROCHLOROTHIAZIDE 25; 100 MG/1; MG/1
TABLET ORAL
COMMUNITY
Start: 2021-11-26

## 2021-12-22 RX ORDER — LANCETS
EACH MISCELLANEOUS
Qty: 200 EACH | Refills: 3 | Status: SHIPPED | OUTPATIENT
Start: 2021-12-22 | End: 2022-10-10

## 2021-12-22 RX ORDER — ATORVASTATIN CALCIUM 20 MG/1
20 TABLET, FILM COATED ORAL
Qty: 30 TABLET | Refills: 6 | Status: SHIPPED | OUTPATIENT
Start: 2021-12-22 | End: 2022-05-13 | Stop reason: SDUPTHER

## 2021-12-22 RX ORDER — BLOOD-GLUCOSE METER
EACH MISCELLANEOUS
Qty: 1 EACH | Refills: 0 | Status: SHIPPED | OUTPATIENT
Start: 2021-12-22 | End: 2022-10-10

## 2021-12-22 RX ORDER — GLIPIZIDE 5 MG/1
TABLET ORAL
Qty: 60 TABLET | Refills: 6 | Status: SHIPPED | OUTPATIENT
Start: 2021-12-22 | End: 2022-05-13 | Stop reason: SDUPTHER

## 2021-12-22 NOTE — LETTER
12/22/2021    Patient: Owen Pardo   YOB: 1965   Date of Visit: 12/22/2021     Jaimee Marte MD  [address] [address]  [address] [facility] [address]    Dear Jaimee Marte MD,      Thank you for referring Mr. Butch Briscoe to [address] [place] [address] for evaluation. My notes for this consultation are attached. If you have questions, please do not hesitate to call me. I look forward to following your patient along with you.       Sincerely,    Edwar Corrales MD

## 2021-12-22 NOTE — PATIENT INSTRUCTIONS
SPECIFIC INSTRUCTIONS BELOW       Check blood sugars BEFORE EACH MEAL          JARDIANCE  25   mg once a day before b-fast ( drink plenty of water )     bydureon is not covered so he is not taking it and moreovver in jan 2022, what is covered - not sure   Will give samples of trulicity or ozempic      Stay on  JANUMET XR 50/1000 MG TWICE  A DAY WITH MEALS       glipizide 5 mg  One  PILL    twice a day , twenty min before b-fast and dinner     Do not take it if you are not eating   Cut the pill to half if eating lesser than normal   Do not avoid lunches       ----------------------------------------------------------------------------------------------------------------------------------------------------------      Stay  on  lipitor 20 at night            Do not skip meals  Do not eat in between meals     Reduce carbs- pasta, rice, potatoes, bread   Do not drink juices or sodas  Donot eat peanut butter      Do not eat sugar free cookies and cakes   Do not eat peaches, grapes, oranges, raisins, pineapples           -------------PAY ATTENTION TO THESE GENERAL INSTRUCTIONS -----------------      - The medications prescribed at this visit will not be available at pharmacy until 6 pm       - YOUR MED LIST IS NOT UP TO DATE AS SOME CHANGES ARE BEING MADE AFTER THE VISIT - FOLLOW SPECIFIC INSTRUCTIONS  ABOVE     -ANY tests other than blood work, which you opt to do  outside the  [facility] imaging facilities, you are responsible for prior authorizations if  required    - [address] [facility]- PLEASE IGNORE     Results     *Normal results will not be notified by a phone call starting January 1 2021   *If you have an upcoming visit, the results will be discussed at the visit   *Please sign up for MY CHART if you want access to your lab and test results  *Abnormal results which require immediate attention will be notified by phone call   *Abnormal results which do not require immediate assistance will be notified in 1-2 weeks       Refills    -    have your pharmacy send us a refill request . Refills are done max for one year and a visit is a must before refills are extended    Follow up appointments -  highly encourage you to make it when you are checking out. We can accommodate you into the schedule based on your clinical situation, but not for extending refills beyond a year. Labs are important to give refills and is important to get labs before the visit     Phone calls  -  Allow  24 hrs.  for non-urgent calls to be returned  Prior authorization - It may take 2-4 weeks to process  Forms  -  FMLA, DMV etc., will take up to 2 weeks to process  Cancellations - please notify the office 2 days in advance   Samples  - will only be dispensed at visits       If not showing for the appointments and cancelling appointments within 24 hours are kept track of and three  of such situations in  two consecutive years will likely be considered for termination from the practice    -------------------------------------------------------------------------------------------------------------------

## 2021-12-22 NOTE — PROGRESS NOTES
HISTORY OF PRESENT ILLNESS   Yesenia Armenta is a 49 y.o. male. HPI   F/u for DM 2 after ONE  year again after last visit from in November 2020     13 months  A gap  No labs , no meter   Gained 9 lbs         Nov 2020    2 years of a gap      No labs,   No meter , not checking sugars   Came late ( did not know we moved )     HE SAW DR. MYLES - IN THE INTERIM FOR HTN  meds were adjusted   He has NO MED BOTTLES   C/o  ED  CIALIS DOES NOT WORK he says         Jan 2020     He has done well   Gained 5 lbs   Thanking me that he did good with diabetes   No meter      Oct 2019 :     Here he goes , A VISIT  AFTER 2 YEARS   HE SAYS HE LOST CONTROL AND IS HERE AGAIN   NO METER IN HAND   HIS SUGARS ARE RUNNING HIGHER             Old history  :   He visits once every 2 years, that has become norm for him  He could not f/u sooner   Because of insurance reasons he says   He get KG xr at no cost - surprise?   He could nto tolerate Trulicity for severe GI side effects , stopped it   He could nto get victoza as plan changed     He has no meter, had not f/u with his pcp   He does feel  hyperglycemic symptoms , polyuria and extreme thirst       Ros :  Constitutional: NAD   Cardiovascular: No PALPITATIONS   Psychiatric: He has a normal mood and affect. Physical Exam   Constitutional: He is oriented to person, place, and time. He appears well-developed and well-nourished. Psychiatric: He has a normal mood and affect.          Labs : NONE NEW         1. TYPE 2 DM UN CONTROLLED  :  A1C IS   8.7 %   From   Dec 2021 by POC     Compared to  7.6 %      From today by Nov 2020   Compared to   6.2 %     From    Jan 17 2019     compared to    7.9 %  BY poc  OCT 2019         Dec 2021     LOSING CONTROL ( for some unknown reason- he takes longer gaps in follow up )    No meter   Not cheking   Stay on glipizide and janumet xr ( HE IS NOT COMPLIANT with taking  TWICE A DAY OF JANUMET XR )    bydureon is not being covered and not sure what happens jan 2022   Gave samples of OZEMPIC ( tried giving trulicity and he says he could not tolerate that in the past )  STAY ON JARDIANCE 25 MG A DAY   He has to take glipizide diligently-     Explained the rationale for taking charge         Nov 2020   No meter   Not cheking   Working more   He is not compliant with diet   Stay on bydureon,glipizide and janumet xr   He has to take glipizide diligently          Jan 2020   Improved control from  being compliant with diet and meds  AND FOLLOW UPS   NO METER FOR REVIEW  again    Continue on janumet xr   Dorinda Glimpse was stopped by formulary   Trulicity was stopped because of severe GI issues   Stay  on bydureon 2 mg a day ( preferred )  Decrease to    glipizide 5 mg   To one  PILL  twice a day , twenty min before b-fast and dinner, as he has understood the reason behind it           2. Hypoglycemia :  Educated on treating the hypoglycemia. Discussed Glipizide  use and prescribed        3. HTN : UNCONTROLLED - ? NON  COMPLIANT WITH MEDS   ON HYZAAR,   AND   AMLODIPINE         4. Dyslipidemia : I started on lipitor  BUT NON - COMPLIANT      5.  ED-  NEW DIAGNOSIS , HYPOGONADism  - NOV 2020  HE HAS NOT DONE ANY  pituitary hormone check       FIRST SET ORDERED TODAY DEC 2021     Need for MRI if pituitary cause is suspected   Discussed pros and cons of TT use   Denies any prostrate cancer in family   Denies any premature CAD in family         Non compliant with med regimen - counseled him to take charge of his health     discussed changes and spent more than 25 min in interpretation and counseling     Total time  : 42 min       > 50 % of time is spent on counseling   Patient voiced understanding her plan of care

## 2021-12-22 NOTE — PROGRESS NOTES
Room 1    Identified pt with two pt identifiers(name and ). Reviewed record in preparation for visit and have obtained necessary documentation. All patient medications has been reviewed. Chief Complaint   Patient presents with    Diabetes       3 most recent PHQ Screens 2021   Little interest or pleasure in doing things Not at all   Feeling down, depressed, irritable, or hopeless Not at all   Total Score PHQ 2 0         Health Maintenance Review: Patient reminded of \"due or due soon\" health maintenance. I have asked the patient to contact his/her primary care provider (PCP) for follow-up on his/her health maintenance. Vitals:    21 0856   BP: (!) 142/96   Pulse: 86   Resp: 18   Temp: 98.1 °F (36.7 °C)   TempSrc: Temporal   SpO2: 96%   Weight: 269 lb 12.8 oz (122.4 kg)   Height: 5' 9\" (1.753 m)   PainSc:   0 - No pain         Lab Results   Component Value Date/Time    Hemoglobin A1c 6.2 (H) 2020 10:40 AM    Hemoglobin A1c (POC) 7.6 2020 10:12 AM       Coordination of Care Questionnaire:   1) Have you been to an emergency room, urgent care, or hospitalized since your last visit?   no       2. Have seen or consulted any other health care provider since your last visit? NO      Patient is accompanied by self I have received verbal consent from Tiana Verma to discuss any/all medical information while they are present in the room.

## 2021-12-23 LAB
ALBUMIN SERPL-MCNC: 4.4 G/DL (ref 3.8–4.9)
ALBUMIN/CREAT UR: 5 MG/G CREAT (ref 0–29)
ALBUMIN/GLOB SERPL: 1.3 {RATIO} (ref 1.2–2.2)
ALP SERPL-CCNC: 106 IU/L (ref 44–121)
ALT SERPL-CCNC: 26 IU/L (ref 0–44)
AST SERPL-CCNC: 19 IU/L (ref 0–40)
BILIRUB SERPL-MCNC: 1.6 MG/DL (ref 0–1.2)
BUN SERPL-MCNC: 13 MG/DL (ref 6–24)
BUN/CREAT SERPL: 12 (ref 9–20)
CALCIUM SERPL-MCNC: 9.5 MG/DL (ref 8.7–10.2)
CHLORIDE SERPL-SCNC: 96 MMOL/L (ref 96–106)
CHOLEST SERPL-MCNC: 203 MG/DL (ref 100–199)
CO2 SERPL-SCNC: 23 MMOL/L (ref 20–29)
CREAT SERPL-MCNC: 1.1 MG/DL (ref 0.76–1.27)
CREAT UR-MCNC: 77.5 MG/DL
FSH SERPL-ACNC: 8.1 MIU/ML (ref 1.5–12.4)
GLOBULIN SER CALC-MCNC: 3.5 G/DL (ref 1.5–4.5)
GLUCOSE SERPL-MCNC: 160 MG/DL (ref 65–99)
HDLC SERPL-MCNC: 50 MG/DL
IMP & REVIEW OF LAB RESULTS: NORMAL
LDLC SERPL CALC-MCNC: 130 MG/DL (ref 0–99)
LH SERPL-ACNC: 6.3 MIU/ML (ref 1.7–8.6)
MICROALBUMIN UR-MCNC: 4.2 UG/ML
POTASSIUM SERPL-SCNC: 3.8 MMOL/L (ref 3.5–5.2)
PROT SERPL-MCNC: 7.9 G/DL (ref 6–8.5)
SODIUM SERPL-SCNC: 136 MMOL/L (ref 134–144)
TESTOST FREE SERPL-MCNC: 9.6 PG/ML (ref 7.2–24)
TESTOST SERPL-MCNC: 245 NG/DL (ref 264–916)
TRIGL SERPL-MCNC: 131 MG/DL (ref 0–149)
VLDLC SERPL CALC-MCNC: 23 MG/DL (ref 5–40)

## 2022-03-18 PROBLEM — K11.9 DISORDER OF SALIVARY GLAND: Status: ACTIVE | Noted: 2020-09-02

## 2022-03-18 PROBLEM — M94.0 COSTAL CHONDRITIS: Status: ACTIVE | Noted: 2020-09-02

## 2022-03-18 PROBLEM — M54.40 LUMBAGO WITH SCIATICA: Status: ACTIVE | Noted: 2020-09-02

## 2022-03-18 PROBLEM — I11.9 MALIGNANT HYPERTENSIVE HEART DISEASE WITHOUT HEART FAILURE: Status: ACTIVE | Noted: 2020-10-14

## 2022-03-18 PROBLEM — E65 CENTRAL OBESITY: Status: ACTIVE | Noted: 2020-09-02

## 2022-03-18 PROBLEM — S43.409A SPRAIN OF SHOULDER: Status: ACTIVE | Noted: 2020-09-02

## 2022-03-19 PROBLEM — R03.0 ELEVATED BLOOD PRESSURE READING: Status: ACTIVE | Noted: 2020-09-02

## 2022-03-19 PROBLEM — G89.29 CHRONIC BACK PAIN: Status: ACTIVE | Noted: 2020-09-02

## 2022-03-19 PROBLEM — R73.9 HYPERGLYCEMIA: Status: ACTIVE | Noted: 2020-09-02

## 2022-03-19 PROBLEM — E78.3 MIXED HYPERGLYCERIDEMIA: Status: ACTIVE | Noted: 2020-10-14

## 2022-03-19 PROBLEM — R59.1 LYMPHADENOPATHY: Status: ACTIVE | Noted: 2020-09-02

## 2022-03-19 PROBLEM — K59.09 CHRONIC CONSTIPATION: Status: ACTIVE | Noted: 2020-09-02

## 2022-03-19 PROBLEM — E66.01 SEVERE OBESITY (HCC): Status: ACTIVE | Noted: 2020-10-14

## 2022-03-19 PROBLEM — M54.9 CHRONIC BACK PAIN: Status: ACTIVE | Noted: 2020-09-02

## 2022-03-19 PROBLEM — L02.01 ABSCESS OF FACE: Status: ACTIVE | Noted: 2020-09-02

## 2022-05-13 ENCOUNTER — OFFICE VISIT (OUTPATIENT)
Dept: ENDOCRINOLOGY | Age: 57
End: 2022-05-13
Payer: COMMERCIAL

## 2022-05-13 VITALS
WEIGHT: 266.8 LBS | SYSTOLIC BLOOD PRESSURE: 145 MMHG | HEART RATE: 82 BPM | HEIGHT: 69 IN | BODY MASS INDEX: 39.52 KG/M2 | DIASTOLIC BLOOD PRESSURE: 91 MMHG | OXYGEN SATURATION: 96 % | TEMPERATURE: 98 F

## 2022-05-13 DIAGNOSIS — E78.2 MIXED HYPERLIPIDEMIA: ICD-10-CM

## 2022-05-13 DIAGNOSIS — E11.65 TYPE 2 DIABETES MELLITUS WITH HYPERGLYCEMIA, WITHOUT LONG-TERM CURRENT USE OF INSULIN (HCC): Primary | ICD-10-CM

## 2022-05-13 DIAGNOSIS — E11.65 TYPE 2 DIABETES MELLITUS WITH HYPERGLYCEMIA, WITHOUT LONG-TERM CURRENT USE OF INSULIN (HCC): ICD-10-CM

## 2022-05-13 DIAGNOSIS — I10 ESSENTIAL HYPERTENSION: ICD-10-CM

## 2022-05-13 PROCEDURE — 99214 OFFICE O/P EST MOD 30 MIN: CPT | Performed by: INTERNAL MEDICINE

## 2022-05-13 PROCEDURE — 3051F HG A1C>EQUAL 7.0%<8.0%: CPT | Performed by: INTERNAL MEDICINE

## 2022-05-13 RX ORDER — SITAGLIPTIN AND METFORMIN HYDROCHLORIDE 50; 1000 MG/1; MG/1
TABLET, FILM COATED, EXTENDED RELEASE ORAL
Qty: 60 TABLET | Refills: 6 | Status: SHIPPED | OUTPATIENT
Start: 2022-05-13 | End: 2022-06-28

## 2022-05-13 RX ORDER — ATORVASTATIN CALCIUM 20 MG/1
20 TABLET, FILM COATED ORAL
Qty: 30 TABLET | Refills: 6 | Status: SHIPPED | OUTPATIENT
Start: 2022-05-13 | End: 2022-06-28

## 2022-05-13 RX ORDER — GLIPIZIDE 5 MG/1
TABLET ORAL
Qty: 60 TABLET | Refills: 6 | Status: SHIPPED | OUTPATIENT
Start: 2022-05-13 | End: 2022-06-28

## 2022-05-13 RX ORDER — SEMAGLUTIDE 1.34 MG/ML
0.5 INJECTION, SOLUTION SUBCUTANEOUS
Qty: 1 BOX | Refills: 6 | Status: SHIPPED | OUTPATIENT
Start: 2022-05-13

## 2022-05-13 NOTE — LETTER
5/15/2022    Patient: Olimpia Holloway   YOB: 1965   Date of Visit: 5/13/2022     Lukas Graff MD  [address] [address]  [address] [facility] [address]    Dear Lukas Graff MD,      Thank you for referring Mr. Crystal James to [address] [place] [address] for evaluation. My notes for this consultation are attached. If you have questions, please do not hesitate to call me. I look forward to following your patient along with you.       Sincerely,    Flako Henry MD

## 2022-05-13 NOTE — PROGRESS NOTES
HISTORY OF PRESENT ILLNESS   Dago Tripathi is a 49 y.o. male. HPI   F/u for DM 2 after last seen  from in dec 2021     Lost 3 lbs   He liked ozempic but did not ask for refills   He is doing better  With TLC        Dec 2021     13 months  A gap  No labs , no meter   Gained 9 lbs           Old history  :   He visits once every 2 years, that has become norm for him  He could not f/u sooner   Because of insurance reasons he says   He get KG xr at no cost - surprise?   He could nto tolerate Trulicity for severe GI side effects , stopped it   He could nto get victoza as plan changed     He has no meter, had not f/u with his pcp   He does feel  hyperglycemic symptoms , polyuria and extreme thirst       Ros :  Constitutional: NAD   Cardiovascular: No PALPITATIONS   Psychiatric: He has a normal mood and affect. Physical Exam   Constitutional: He is oriented to person, place, and time. He appears well-developed and well-nourished. Psychiatric: He has a normal mood and affect. Lab Results   Component Value Date/Time    Hemoglobin A1c 7.0 (H) 05/06/2022 10:07 AM    Hemoglobin A1c 6.2 (H) 01/17/2020 10:40 AM    Hemoglobin A1c 7.5 (H) 08/08/2011 10:16 AM    Glucose 173 (H) 05/06/2022 10:07 AM    Glucose  03/01/2017 09:29 AM    Microalbumin/Creat ratio (mg/g creat) 10 05/06/2022 10:07 AM    Microalbumin,urine random 0.94 05/06/2022 10:07 AM    LDL, calculated 62.6 05/06/2022 10:07 AM    Creatinine 1.04 05/06/2022 10:07 AM      Lab Results   Component Value Date/Time    Cholesterol, total 126 05/06/2022 10:07 AM    HDL Cholesterol 46 05/06/2022 10:07 AM    LDL, calculated 62.6 05/06/2022 10:07 AM    Triglyceride 87 05/06/2022 10:07 AM    CHOL/HDL Ratio 2.7 05/06/2022 10:07 AM     Lab Results   Component Value Date/Time    ALT (SGPT) 35 05/06/2022 10:07 AM    Alk.  phosphatase 100 05/06/2022 10:07 AM    Bilirubin, total 1.4 (H) 05/06/2022 10:07 AM    Albumin 3.8 05/06/2022 10:07 AM    Protein, total 7.8 05/06/2022 10:07 AM     Lab Results   Component Value Date/Time    GFR est non-AA >60 05/06/2022 10:07 AM    GFR est AA >60 05/06/2022 10:07 AM    Creatinine 1.04 05/06/2022 10:07 AM    BUN 15 05/06/2022 10:07 AM    Sodium 135 (L) 05/06/2022 10:07 AM    Potassium 4.0 05/06/2022 10:07 AM    Chloride 102 05/06/2022 10:07 AM    CO2 25 05/06/2022 10:07 AM       Assessment and PLAN       1. TYPE 2 DM  unCONTROLLED  :  A1C IS   7 %  From  MAy 2022   Compared to    8.7 %   From   Dec 2021 by POC     Compared to  7.6 %      From today by Nov 2020   Compared to   6.2 %     From    Jan 17 2019     compared to    7.9 %  BY poc  OCT 2019     Better CONTROL    No meter , Not cheking   Stay on glipizide and janumet xr   TWICE A DAY   bydureon is not being covered and not sure what happens jan 2022   Algade 86 25 MG A DAY   Stay on  glipizide diligently-   Explained the rationale for taking charge       Dec 2021     LOSING CONTROL ( for some unknown reason- he takes longer gaps in follow up )  No meter   Not cheking   Stay on glipizide and janumet xr ( HE IS NOT COMPLIANT with taking  TWICE A DAY OF JANUMET XR )    bydureon is not being covered and not sure what happens jan 2022   Zack[address] ( tried giving trulicity and he says he could not tolerate that in the past )  STAY ON JARDIANCE 25 MG A DAY   He has to take glipizide diligently-   Explained the rationale for taking charge       2. Hypoglycemia :  Educated on treating the hypoglycemia. Discussed Glipizide  use and prescribed        3. HTN : UNCONTROLLED -  Improved compliance  WITH MEDS   ON HYZAAR,   AND   AMLODIPINE         HYPONATREMIA  ? ? - COMSUMING TOO MUCH WATER  -  AND   ? HCTZ         4. Dyslipidemia :  on lipitor        5.  ED-  NEW DIAGNOSIS , HYPOGONADism  - NOV 2020     2 sets  - low ,  But insurance may not buy it   In dec 2021   Will redo labs in dec 2022     Need for MRI if pituitary cause is suspected   Discussed pros and cons of TT use   Denies any prostrate cancer in family   Denies any premature CAD in family       He improved compliance very well       > 50 % of time is spent on counseling   Patient voiced understanding her plan of care

## 2022-05-13 NOTE — PATIENT INSTRUCTIONS
SPECIFIC INSTRUCTIONS BELOW         Check blood sugars BEFORE EACH MEAL          JARDIANCE  25   mg once a day before b-fast ( drink plenty of water )     Start at  0.5 mg  ozempic   Once a week      Stay on  JANUMET XR 50/1000 MG TWICE  A DAY WITH MEALS       glipizide 5 mg  One  PILL    twice a day , twenty min before b-fast and dinner     Do not take it if you are not eating   Cut the pill to half if eating lesser than normal   Do not avoid lunches       ----------------------------------------------------------------------------------------------------------------------------------------------------------      Stay  on  lipitor 20 at night              DRINK water   Do not skip meals  Do not eat in between meals    Reduce carbs- pasta, rice, potatoes, bread   Try to avoid processed bread products like BISCUITS, CROISSANTS, MUFFINS    Do not drink juices or sodas, even if they are calorie zero or diet drinks and especially avoid using powders like crystalloids , SESAR-AIDS     Do not eat peanut butter     Do not eat sugar free cookies and cakes   Do not eat peaches, oranges, pineapples, raisins, grapes , canteloupe , honey dew, mangoes , watermelon  and fruit medleys          -------------PAY ATTENTION TO THESE GENERAL INSTRUCTIONS -----------------      - The medications prescribed at this visit will not be available at pharmacy until 6 pm       - YOUR MED LIST IS NOT UP TO DATE AS SOME CHANGES ARE BEING MADE AFTER THE VISIT - FOLLOW SPECIFIC INSTRUCTIONS  ABOVE     -ANY tests other than blood work, which you opt to do  outside the  [facility] facilities, you are responsible for prior authorizations if  required    - [address] AVS- PLEASE IGNORE     Results     *Normal results will not be notified by a phone call starting January 1 2021   *If you have an upcoming visit, the results will be discussed at the visit   *Please sign up for MY CHART if you want access to your lab and test results  *Abnormal results which require immediate attention will be notified by phone call   *Abnormal results which do not require immediate assistance will be notified in 1-2 weeks       Refills    -    have your pharmacy send us a refill request . Refills are done max for one year and a visit is a must before refills are extended    Follow up appointments -  highly encourage you to make it when you are checking out. We can accommodate you into the schedule based on your clinical situation, but not for extending refills beyond a year. Labs are important to give refills and is important to get labs before the visit     Phone calls  -  Allow  24 hrs.  for non-urgent calls to be returned  Prior authorization - It may take 2-4 weeks to process  Forms  -  FMLA, DMV etc., will take up to 2 weeks to process  Cancellations - please notify the office 2 days in advance   Samples  - will only be dispensed at visits       If not showing for the appointments and cancelling appointments within 24 hours are kept track of and three  of such situations in  two consecutive years will likely be considered for termination from the practice    -------------------------------------------------------------------------------------------------------------------

## 2022-06-28 DIAGNOSIS — E11.65 TYPE 2 DIABETES MELLITUS WITH HYPERGLYCEMIA, WITHOUT LONG-TERM CURRENT USE OF INSULIN (HCC): ICD-10-CM

## 2022-06-28 RX ORDER — EMPAGLIFLOZIN 25 MG/1
TABLET, FILM COATED ORAL
Qty: 30 TABLET | Refills: 0 | Status: SHIPPED | OUTPATIENT
Start: 2022-06-28

## 2022-06-28 RX ORDER — ATORVASTATIN CALCIUM 20 MG/1
20 TABLET, FILM COATED ORAL
Qty: 30 TABLET | Refills: 0 | Status: SHIPPED | OUTPATIENT
Start: 2022-06-28

## 2022-06-28 RX ORDER — SITAGLIPTIN AND METFORMIN HYDROCHLORIDE 50; 1000 MG/1; MG/1
TABLET, FILM COATED, EXTENDED RELEASE ORAL
Qty: 60 TABLET | Refills: 0 | Status: SHIPPED | OUTPATIENT
Start: 2022-06-28

## 2022-06-28 RX ORDER — GLIPIZIDE 5 MG/1
TABLET ORAL
Qty: 60 TABLET | Refills: 0 | Status: SHIPPED | OUTPATIENT
Start: 2022-06-28

## 2022-08-03 ENCOUNTER — TRANSCRIBE ORDER (OUTPATIENT)
Dept: SCHEDULING | Age: 57
End: 2022-08-03

## 2022-08-03 DIAGNOSIS — R22.32 LOCALIZED SWELLING ON LEFT HAND: Primary | ICD-10-CM

## 2022-08-11 ENCOUNTER — HOSPITAL ENCOUNTER (OUTPATIENT)
Dept: MRI IMAGING | Age: 57
Discharge: HOME | End: 2022-08-11
Attending: PHYSICIAN ASSISTANT
Payer: COMMERCIAL

## 2022-08-11 DIAGNOSIS — R22.32 LOCALIZED SWELLING ON LEFT HAND: ICD-10-CM

## 2022-08-11 LAB — CREAT BLD-MCNC: 0.8 MG/DL (ref 0.6–1.3)

## 2022-08-11 PROCEDURE — 82565 ASSAY OF CREATININE: CPT

## 2022-08-11 PROCEDURE — 74011250636 HC RX REV CODE- 250/636: Performed by: PHYSICIAN ASSISTANT

## 2022-08-11 PROCEDURE — 73220 MRI UPPR EXTREMITY W/O&W/DYE: CPT

## 2022-08-11 PROCEDURE — A9577 INJ MULTIHANCE: HCPCS | Performed by: PHYSICIAN ASSISTANT

## 2022-08-11 RX ADMIN — GADOBENATE DIMEGLUMINE 20 ML: 529 INJECTION, SOLUTION INTRAVENOUS at 11:12

## 2022-10-10 ENCOUNTER — OFFICE VISIT (OUTPATIENT)
Dept: ENDOCRINOLOGY | Age: 57
End: 2022-10-10
Payer: COMMERCIAL

## 2022-10-10 VITALS
HEART RATE: 66 BPM | HEIGHT: 69 IN | WEIGHT: 265.2 LBS | OXYGEN SATURATION: 95 % | DIASTOLIC BLOOD PRESSURE: 89 MMHG | SYSTOLIC BLOOD PRESSURE: 141 MMHG | RESPIRATION RATE: 18 BRPM | BODY MASS INDEX: 39.28 KG/M2 | TEMPERATURE: 97.3 F

## 2022-10-10 DIAGNOSIS — I10 ESSENTIAL HYPERTENSION: ICD-10-CM

## 2022-10-10 DIAGNOSIS — E29.1 HYPOGONADISM MALE: ICD-10-CM

## 2022-10-10 DIAGNOSIS — E78.2 MIXED HYPERLIPIDEMIA: ICD-10-CM

## 2022-10-10 DIAGNOSIS — E11.65 TYPE 2 DIABETES MELLITUS WITH HYPERGLYCEMIA, WITHOUT LONG-TERM CURRENT USE OF INSULIN (HCC): Primary | ICD-10-CM

## 2022-10-10 PROCEDURE — 3051F HG A1C>EQUAL 7.0%<8.0%: CPT | Performed by: INTERNAL MEDICINE

## 2022-10-10 PROCEDURE — 99214 OFFICE O/P EST MOD 30 MIN: CPT | Performed by: INTERNAL MEDICINE

## 2022-10-10 RX ORDER — DOCUSATE SODIUM 100 MG/1
100 CAPSULE, LIQUID FILLED ORAL 2 TIMES DAILY
Qty: 60 CAPSULE | Refills: 2 | Status: SHIPPED | OUTPATIENT
Start: 2022-10-10 | End: 2023-01-08

## 2022-10-10 NOTE — PROGRESS NOTES
1. Have you been to the ER, urgent care clinic since your last visit? [facility] since your last visit? No    2. Have you seen or consulted any other health care providers outside of the [address] [place] [address] since your last visit? Include any pap smears or colon screening.  No    Wt Readings from Last 3 Encounters:   10/10/22 265 lb 3.2 oz (120.3 kg)   05/13/22 266 lb 12.8 oz (121 kg)   12/22/21 269 lb 12.8 oz (122.4 kg)     Temp Readings from Last 3 Encounters:   10/10/22 97.3 °F (36.3 °C) (Temporal)   05/13/22 98 °F (36.7 °C) (Temporal)   12/22/21 98.1 °F (36.7 °C) (Temporal)     BP Readings from Last 3 Encounters:   10/10/22 (!) 141/89   05/13/22 (!) 145/91   12/22/21 (!) 142/96     Pulse Readings from Last 3 Encounters:   10/10/22 66   05/13/22 82   12/22/21 86     Lab Results   Component Value Date/Time    Hemoglobin A1c 7.0 (H) 10/03/2022 09:50 AM    Hemoglobin A1c (POC) 8.7 12/22/2021 09:13 AM

## 2022-10-10 NOTE — PATIENT INSTRUCTIONS
SPECIFIC INSTRUCTIONS BELOW       Check blood sugars BEFORE EACH MEAL          JARDIANCE  25   mg once a day before b-fast ( drink plenty of water )      0.5 mg  ozempic   Once a week      Stay on  JANUMET XR 50/1000 MG TWICE  A DAY WITH MEALS       glipizide 5 mg  One  PILL    twice a day , twenty min before b-fast and dinner     Do not take it if you are not eating   Cut the pill to half if eating lesser than normal   Do not avoid lunches       Stay  on  lipitor 20 at night              DRINK water   Do not skip meals  Do not eat in between meals    Reduce carbs- pasta, rice, potatoes, bread   Try to avoid processed bread products like BISCUITS, CROISSANTS, MUFFINS    Do not drink juices or sodas, even if they are calorie zero or diet drinks and especially avoid using powders like crystalloids , SESAR-AIDS     Do not eat peanut butter     Do not eat sugar free cookies and cakes   Do not eat peaches, oranges, pineapples, raisins, grapes , canteloupe , honey dew, mangoes , watermelon  and fruit medleys          -------------PAY ATTENTION TO THESE GENERAL INSTRUCTIONS -----------------          -------------PAY ATTENTION TO THESE GENERAL INSTRUCTIONS -----------------      - The medications prescribed at this visit will not be available at pharmacy until 6 pm       - YOUR MED LIST IS NOT UP TO DATE AS SOME CHANGES ARE BEING MADE AFTER THE VISIT - FOLLOW SPECIFIC INSTRUCTIONS  ABOVE     -ANY tests other than blood work, which you opt to do  outside the  [facility] imaging facilities, you are responsible for prior authorizations if  required    - [address] [facility]- PLEASE IGNORE     Results     *Normal results will not be notified by a phone call starting January 1 2021   *If you have an upcoming visit, the results will be discussed at the visit   *Please sign up for MY CHART if you want access to your lab and test results  *Abnormal results which require immediate attention will be notified by phone call   *Abnormal results which do not require immediate assistance will be notified in 1-2 weeks       Refills    -    have your pharmacy send us a refill request . Refills are done max for one year and a visit is a must before refills are extended    Follow up appointments -  highly encourage you to make it when you are checking out. We can accommodate you into the schedule based on your clinical situation, but not for extending refills beyond a year. Labs are important to give refills and is important to get labs before the visit     Phone calls  -  Allow  24 hrs.  for non-urgent calls to be returned  Prior authorization - It may take 2-4 weeks to process  Forms  -  FMLA, DMV etc., will take up to 2 weeks to process  Cancellations - please notify the office 2 days in advance   Samples  - will only be dispensed at visits       If not showing for the appointments and cancelling appointments within 24 hours are kept track of and three  of such situations in  two consecutive years will likely be considered for termination from the practice    -------------------------------------------------------------------------------------------------------------------

## 2022-10-10 NOTE — PROGRESS NOTES
HISTORY OF PRESENT ILLNESS       Lowella Apley is a 49 y.o. male. HPI   F/u for DM 2 after last seen  from in may 2022         May 2022      Lost 3 lbs   He liked ozempic but did not ask for refills   He is doing better  With TLC      Old history  :   He visits once every 2 years, that has become norm for him  He could not f/u sooner   Because of insurance reasons he says   He get KG xr at no cost - surprise? He could nto tolerate Trulicity for severe GI side effects , stopped it   He could nto get victoza as plan changed     He has no meter, had not f/u with his pcp   He does feel  hyperglycemic symptoms , polyuria and extreme thirst       Ros :  Constitutional: NAD   Gi - constipation   Psychiatric: He has a normal mood and affect. Physical Exam   Constitutional: He is oriented to person, place, and time. He appears well-developed and well-nourished. Psychiatric: He has a normal mood and affect. Lab Results   Component Value Date/Time    Hemoglobin A1c 7.0 (H) 10/03/2022 09:50 AM    Hemoglobin A1c 7.0 (H) 05/06/2022 10:07 AM    Hemoglobin A1c 6.2 (H) 01/17/2020 10:40 AM    Glucose 154 (H) 10/03/2022 09:50 AM    Glucose  03/01/2017 09:29 AM    Microalbumin/Creat ratio (mg/g creat) 14 10/03/2022 09:50 AM    Microalbumin,urine random 1.31 10/03/2022 09:50 AM    LDL, calculated 77.8 10/03/2022 09:50 AM    Creatinine (POC) 0.8 08/11/2022 08:58 AM    Creatinine 1.07 10/03/2022 09:50 AM      Lab Results   Component Value Date/Time    Cholesterol, total 137 10/03/2022 09:50 AM    HDL Cholesterol 46 10/03/2022 09:50 AM    LDL, calculated 77.8 10/03/2022 09:50 AM    Triglyceride 66 10/03/2022 09:50 AM    CHOL/HDL Ratio 3.0 10/03/2022 09:50 AM     Lab Results   Component Value Date/Time    ALT (SGPT) 42 10/03/2022 09:50 AM    Alk.  phosphatase 105 10/03/2022 09:50 AM    Bilirubin, total 1.2 (H) 10/03/2022 09:50 AM    Albumin 4.1 10/03/2022 09:50 AM    Protein, total 8.0 10/03/2022 09:50 AM Lab Results   Component Value Date/Time    GFR est non-AA >60 10/03/2022 09:50 AM    GFRNA, POC >60 08/11/2022 08:58 AM    GFR est AA >60 10/03/2022 09:50 AM    GFRAA, POC >60 08/11/2022 08:58 AM    Creatinine 1.07 10/03/2022 09:50 AM    Creatinine (POC) 0.8 08/11/2022 08:58 AM    BUN 18 10/03/2022 09:50 AM    Sodium 137 10/03/2022 09:50 AM    Potassium 4.1 10/03/2022 09:50 AM    Chloride 103 10/03/2022 09:50 AM    CO2 29 10/03/2022 09:50 AM       Assessment and PLAN       1. TYPE 2 DM  unCONTROLLED  :  A1C is   7%   from   October 2022   compared to    7 %  From  MAy 2022   Compared to    8.7 %   From   Dec 2021 by POC     Compared to  7.6 %      From today by Nov 2020   Compared to   6.2 %     From    Jan 17 2019     compared to    7.9 %  BY poc  OCT 2019     October 2022     Better CONTROL  He has generic meter - strips are pricy   Advised conservative measures for constipation   Stay on glipizide and janumet xr   TWICE A DAY   Stay on   Verline Ariana 25 MG A DAY      May 2022    Better CONTROL  No meter , Not cheking   Stay on glipizide and janumet xr   TWICE A DAY   bydureon is not being covered and not sure what happens [address] 25 MG A DAY   Stay on  glipizide diligently-   Explained the rationale for taking charge       2. Hypoglycemia :  Educated on treating the hypoglycemia. Discussed Glipizide  use and prescribed      3. HTN : UNCONTROLLED -  Improved compliance  WITH MEDS requested again   ON HYZAAR,   AND   AMLODIPINE       4. Dyslipidemia :  on lipitor      5.  ED-  NEW DIAGNOSIS , HYPOGONADism  - NOV 2020   Will redo labs in dec 2022   Need for MRI if pituitary cause is suspected         Reviewed results with patient and discussed the labs being ordered today/bnv  Patient voiced understanding of plan of care

## 2022-10-10 NOTE — LETTER
10/10/2022    Patient: Jasmin Keyes   YOB: 1965   Date of Visit: 10/10/2022     Jordin Latham MD  [address] [address]  [address] [facility] [address]    Dear Jordin Latham MD,      Thank you for referring Mr. Damien Lentz to [address] [place] [address] for evaluation. My notes for this consultation are attached. If you have questions, please do not hesitate to call me. I look forward to following your patient along with you.       Sincerely,    Lavelle Matthews MD

## 2023-01-24 ENCOUNTER — TELEPHONE (OUTPATIENT)
Dept: ENDOCRINOLOGY | Age: 58
End: 2023-01-24

## 2023-01-24 NOTE — TELEPHONE ENCOUNTER
Mr. Sangeetha Vidal had  a lab appt on 1/16/2023. He was also scheduled on 1/24/2023 and then labs on 3/31/2023. Patient does not remember why or if he even needs 3 sets of labs. He did not come in for 1/24. Do we need to reschedule the 1/24 missed lab appt?

## 2023-01-24 NOTE — TELEPHONE ENCOUNTER
One set close to my visit is for diabetes DM4 follow up    The one he had on jan 16th is first set for  Testosteroine   Jan 24 th was supposed to be second set for TT - a week apart     If his first set was abnormal, he needed to get the second done   But he did nto need it as first set labs had normal TT     Rusty Salas MD

## 2023-04-13 DIAGNOSIS — E11.65 TYPE 2 DIABETES MELLITUS WITH HYPERGLYCEMIA, WITHOUT LONG-TERM CURRENT USE OF INSULIN (HCC): Primary | ICD-10-CM

## 2023-04-17 NOTE — TELEPHONE ENCOUNTER
Clayton Day has advised us of the same. The mg are still the same, the mL have just increased from 1.5 mL to 3 mL. Per Clayton Day this is to make pen sizes uniform.

## 2023-04-18 RX ORDER — SEMAGLUTIDE 0.68 MG/ML
0.5 INJECTION, SOLUTION SUBCUTANEOUS
Qty: 3 ML | Refills: 11 | Status: SHIPPED | OUTPATIENT
Start: 2023-04-18

## 2023-10-05 ENCOUNTER — NURSE ONLY (OUTPATIENT)
Age: 58
End: 2023-10-05

## 2023-10-05 DIAGNOSIS — I10 ESSENTIAL (PRIMARY) HYPERTENSION: ICD-10-CM

## 2023-10-05 DIAGNOSIS — E29.1 TESTICULAR HYPOFUNCTION: ICD-10-CM

## 2023-10-05 DIAGNOSIS — E11.65 TYPE 2 DIABETES MELLITUS WITH HYPERGLYCEMIA, WITHOUT LONG-TERM CURRENT USE OF INSULIN (HCC): Primary | ICD-10-CM

## 2023-10-05 DIAGNOSIS — E78.2 MIXED HYPERLIPIDEMIA: ICD-10-CM

## 2023-10-05 DIAGNOSIS — E11.65 TYPE 2 DIABETES MELLITUS WITH HYPERGLYCEMIA, WITHOUT LONG-TERM CURRENT USE OF INSULIN (HCC): ICD-10-CM

## 2023-10-05 LAB
ALBUMIN SERPL-MCNC: 3.7 G/DL (ref 3.5–5)
ALBUMIN/GLOB SERPL: 1 (ref 1.1–2.2)
ALP SERPL-CCNC: 87 U/L (ref 45–117)
ALT SERPL-CCNC: 64 U/L (ref 12–78)
ANION GAP SERPL CALC-SCNC: 2 MMOL/L (ref 5–15)
AST SERPL-CCNC: 44 U/L (ref 15–37)
BILIRUB SERPL-MCNC: 0.7 MG/DL (ref 0.2–1)
BUN SERPL-MCNC: 15 MG/DL (ref 6–20)
BUN/CREAT SERPL: 16 (ref 12–20)
CALCIUM SERPL-MCNC: 9.1 MG/DL (ref 8.5–10.1)
CHLORIDE SERPL-SCNC: 105 MMOL/L (ref 97–108)
CHOLEST SERPL-MCNC: 135 MG/DL
CO2 SERPL-SCNC: 27 MMOL/L (ref 21–32)
CREAT SERPL-MCNC: 0.91 MG/DL (ref 0.7–1.3)
CREAT UR-MCNC: 109 MG/DL
EST. AVERAGE GLUCOSE BLD GHB EST-MCNC: 154 MG/DL
FSH SERPL-ACNC: 10.3 MIU/ML
GLOBULIN SER CALC-MCNC: 3.8 G/DL (ref 2–4)
GLUCOSE SERPL-MCNC: 202 MG/DL (ref 65–100)
HBA1C MFR BLD: 7 % (ref 4–5.6)
HDLC SERPL-MCNC: 43 MG/DL
HDLC SERPL: 3.1 (ref 0–5)
LDLC SERPL CALC-MCNC: 78.8 MG/DL (ref 0–100)
LH SERPL-ACNC: 7.4 MIU/ML
MICROALBUMIN UR-MCNC: 2.69 MG/DL
MICROALBUMIN/CREAT UR-RTO: 25 MG/G (ref 0–30)
POTASSIUM SERPL-SCNC: 4.1 MMOL/L (ref 3.5–5.1)
PROT SERPL-MCNC: 7.5 G/DL (ref 6.4–8.2)
SODIUM SERPL-SCNC: 134 MMOL/L (ref 136–145)
TRIGL SERPL-MCNC: 66 MG/DL
VLDLC SERPL CALC-MCNC: 13.2 MG/DL

## 2023-10-12 LAB
TESTOST FREE SERPL-MCNC: 7.7 PG/ML (ref 7.2–24)
TESTOST SERPL-MCNC: 627 NG/DL (ref 264–916)

## 2023-10-17 ENCOUNTER — OFFICE VISIT (OUTPATIENT)
Age: 58
End: 2023-10-17
Payer: COMMERCIAL

## 2023-10-17 VITALS
HEART RATE: 98 BPM | SYSTOLIC BLOOD PRESSURE: 142 MMHG | HEIGHT: 69 IN | BODY MASS INDEX: 38.21 KG/M2 | DIASTOLIC BLOOD PRESSURE: 80 MMHG | RESPIRATION RATE: 18 BRPM | OXYGEN SATURATION: 97 % | WEIGHT: 258 LBS

## 2023-10-17 DIAGNOSIS — E78.2 MIXED HYPERLIPIDEMIA: ICD-10-CM

## 2023-10-17 DIAGNOSIS — I10 ESSENTIAL (PRIMARY) HYPERTENSION: ICD-10-CM

## 2023-10-17 DIAGNOSIS — E29.1 TESTICULAR HYPOFUNCTION: ICD-10-CM

## 2023-10-17 DIAGNOSIS — E11.65 TYPE 2 DIABETES MELLITUS WITH HYPERGLYCEMIA, WITHOUT LONG-TERM CURRENT USE OF INSULIN (HCC): Primary | ICD-10-CM

## 2023-10-17 PROCEDURE — 3077F SYST BP >= 140 MM HG: CPT | Performed by: INTERNAL MEDICINE

## 2023-10-17 PROCEDURE — 3079F DIAST BP 80-89 MM HG: CPT | Performed by: INTERNAL MEDICINE

## 2023-10-17 PROCEDURE — 99214 OFFICE O/P EST MOD 30 MIN: CPT | Performed by: INTERNAL MEDICINE

## 2023-10-17 PROCEDURE — 3051F HG A1C>EQUAL 7.0%<8.0%: CPT | Performed by: INTERNAL MEDICINE

## 2023-10-17 NOTE — PROGRESS NOTES
Blood pressure (!) 142/80, pulse 98, resp. rate 18, height 5' 9\" (1.753 m), weight 258 lb (117 kg), SpO2 97 %.

## 2023-10-17 NOTE — PATIENT INSTRUCTIONS
SPECIFIC INSTRUCTIONS BELOW         Check blood sugars BEFORE EACH MEAL          JARDIANCE  25   mg once a day before b-fast ( drink plenty of water )      0.5 mg  ozempic   Once a week      Stay on  JANUMET XR 50/1000 MG TWICE  A DAY WITH MEALS       glipizide 5 mg  One  PILL    twice a day , twenty min before b-fast and dinner      Do not take it if you are not eating   Cut the pill to half if eating lesser than normal   Do not avoid lunches         Stay  on  lipitor 20 at night       ---------------------------------------------------------------------------------------------------        DRINK water   Do not skip meals  Do not eat in between meals     Reduce carbs- pasta, rice, potatoes, bread   Try to avoid processed bread products like BISCUITS, CROISSANTS, MUFFINS     Do not drink juices or sodas, even if they are calorie zero or diet drinks and especially avoid using powders like crystalloids , SAUL-AIDS      Do not eat peanut butter      Do not eat sugar free cookies and cakes   Do not eat peaches, oranges, pineapples, raisins, grapes , canteloupe , honey dew, mangoes , watermelon  and fruit medleys           -------------PAY ATTENTION TO THESE GENERAL INSTRUCTIONS -----------------        - The medications prescribed at this visit will not be available at pharmacy until 6 pm         - YOUR MED LIST IS NOT UP TO DATE AS SOME CHANGES ARE BEING MADE AFTER THE VISIT - FOLLOW SPECIFIC INSTRUCTIONS  ABOVE      -ANY tests other than blood work, which you opt to do  outside the  [facility] imaging facilities, you are responsible for prior authorizations if  required     - [address] [place] [address] Drive AVS- PLEASE IGNORE      Results      *Normal results will not be notified by a phone call starting January 1 2021   *If you have an upcoming visit, the results will be discussed at the visit   *Please sign up for MY CHART if you want access to your lab and test results  *Abnormal results

## 2023-10-17 NOTE — PROGRESS NOTES
Horacio Martines MD FACE          HISTORY OF PRESENT ILLNESS          Ron Fonseca is a 56  y.o. male. HPI    F/u for DM 2 after last seen   from in  April 2023     Lost 3 lbs since last visit       April 2023       He saw the cardiologist . Dr. Spencer Kenyon  and he suggested taking  losartan    His BP   is  high     LOST 4 lbs             May 2022        Lost 3 lbs    He liked ozempic but did not ask for refills    He is doing better  With TLC         Old history  :    He visits once every 2 years, that has become norm for him   He could not f/u sooner   Because of insurance reasons he says    He get KG xr at no cost - surprise? He could nto tolerate Trulicity for severe GI side effects , stopped it    He could nto get victoza as plan changed      He has no meter, had not f/u with his pcp    He does feel  hyperglycemic symptoms , polyuria and extreme thirst          Ros :   Constitutional: NAD    Gi - constipation    Psychiatric: He has a normal mood and affect. Physical Exam    Constitutional: He is oriented to person, place, and time. He appears well-developed and well-nourished. Psychiatric: He has a normal mood and affect.          Labs    Diabetes    Lab Results   Component Value Date    LABA1C 7.0 (H) 10/05/2023    LABA1C 7.3 (H) 03/31/2023    LABA1C 7.0 (H) 10/03/2022       Lab Results   Component Value Date     (L) 10/05/2023    K 4.1 10/05/2023     10/05/2023    CO2 27 10/05/2023    BUN 15 10/05/2023    CREATININE 0.91 10/05/2023    GLUCOSE 202 (H) 10/05/2023    CALCIUM 9.1 10/05/2023    PROT 7.5 10/05/2023    LABALBU 3.7 10/05/2023    BILITOT 0.7 10/05/2023    ALKPHOS 87 10/05/2023    AST 44 (H) 10/05/2023    ALT 64 10/05/2023    LABGLOM >60 10/05/2023    GFRAA >60 10/03/2022    AGRATIO 1.1 03/31/2023    GLOB 3.8 10/05/2023    CHOL 135 10/05/2023    TRIG 66 10/05/2023    LDLCALC 78.8 10/05/2023    HDL 43

## 2024-04-08 DIAGNOSIS — M79.671 FOOT PAIN, RIGHT: Primary | ICD-10-CM

## 2024-04-10 ENCOUNTER — NURSE ONLY (OUTPATIENT)
Age: 59
End: 2024-04-10

## 2024-04-10 DIAGNOSIS — I10 ESSENTIAL (PRIMARY) HYPERTENSION: ICD-10-CM

## 2024-04-10 DIAGNOSIS — E11.65 TYPE 2 DIABETES MELLITUS WITH HYPERGLYCEMIA, WITHOUT LONG-TERM CURRENT USE OF INSULIN (HCC): ICD-10-CM

## 2024-04-10 DIAGNOSIS — E29.1 TESTICULAR HYPOFUNCTION: ICD-10-CM

## 2024-04-10 DIAGNOSIS — E78.2 MIXED HYPERLIPIDEMIA: ICD-10-CM

## 2024-04-10 LAB
ALBUMIN SERPL-MCNC: 3.6 G/DL (ref 3.5–5)
ALBUMIN/GLOB SERPL: 1 (ref 1.1–2.2)
ALP SERPL-CCNC: 92 U/L (ref 45–117)
ALT SERPL-CCNC: 38 U/L (ref 12–78)
ANION GAP SERPL CALC-SCNC: 3 MMOL/L (ref 5–15)
AST SERPL-CCNC: 24 U/L (ref 15–37)
BILIRUB SERPL-MCNC: 1.4 MG/DL (ref 0.2–1)
BUN SERPL-MCNC: 12 MG/DL (ref 6–20)
BUN/CREAT SERPL: 12 (ref 12–20)
CALCIUM SERPL-MCNC: 9.5 MG/DL (ref 8.5–10.1)
CHLORIDE SERPL-SCNC: 105 MMOL/L (ref 97–108)
CHOLEST SERPL-MCNC: 162 MG/DL
CO2 SERPL-SCNC: 28 MMOL/L (ref 21–32)
CREAT SERPL-MCNC: 1.04 MG/DL (ref 0.7–1.3)
CREAT UR-MCNC: 108 MG/DL
EST. AVERAGE GLUCOSE BLD GHB EST-MCNC: 151 MG/DL
GLOBULIN SER CALC-MCNC: 3.6 G/DL (ref 2–4)
GLUCOSE SERPL-MCNC: 204 MG/DL (ref 65–100)
HBA1C MFR BLD: 6.9 % (ref 4–5.6)
HDLC SERPL-MCNC: 49 MG/DL
HDLC SERPL: 3.3 (ref 0–5)
LDLC SERPL CALC-MCNC: 97 MG/DL (ref 0–100)
MICROALBUMIN UR-MCNC: 0.78 MG/DL
MICROALBUMIN/CREAT UR-RTO: 7 MG/G (ref 0–30)
POTASSIUM SERPL-SCNC: 4.2 MMOL/L (ref 3.5–5.1)
PROT SERPL-MCNC: 7.2 G/DL (ref 6.4–8.2)
SODIUM SERPL-SCNC: 136 MMOL/L (ref 136–145)
TRIGL SERPL-MCNC: 80 MG/DL
VLDLC SERPL CALC-MCNC: 16 MG/DL

## 2024-04-17 ENCOUNTER — OFFICE VISIT (OUTPATIENT)
Age: 59
End: 2024-04-17
Payer: COMMERCIAL

## 2024-04-17 VITALS
HEIGHT: 69 IN | DIASTOLIC BLOOD PRESSURE: 88 MMHG | HEART RATE: 85 BPM | BODY MASS INDEX: 37.77 KG/M2 | RESPIRATION RATE: 16 BRPM | SYSTOLIC BLOOD PRESSURE: 143 MMHG | WEIGHT: 255 LBS | TEMPERATURE: 97.8 F | OXYGEN SATURATION: 98 %

## 2024-04-17 DIAGNOSIS — I10 ESSENTIAL (PRIMARY) HYPERTENSION: ICD-10-CM

## 2024-04-17 DIAGNOSIS — E11.65 TYPE 2 DIABETES MELLITUS WITH HYPERGLYCEMIA, WITHOUT LONG-TERM CURRENT USE OF INSULIN (HCC): Primary | ICD-10-CM

## 2024-04-17 DIAGNOSIS — E78.2 MIXED HYPERLIPIDEMIA: ICD-10-CM

## 2024-04-17 PROCEDURE — 99214 OFFICE O/P EST MOD 30 MIN: CPT | Performed by: INTERNAL MEDICINE

## 2024-04-17 PROCEDURE — 3044F HG A1C LEVEL LT 7.0%: CPT | Performed by: INTERNAL MEDICINE

## 2024-04-17 PROCEDURE — 3079F DIAST BP 80-89 MM HG: CPT | Performed by: INTERNAL MEDICINE

## 2024-04-17 PROCEDURE — 3077F SYST BP >= 140 MM HG: CPT | Performed by: INTERNAL MEDICINE

## 2024-04-17 RX ORDER — SEMAGLUTIDE 1.34 MG/ML
INJECTION, SOLUTION SUBCUTANEOUS
Qty: 9 ML | Refills: 3 | Status: SHIPPED | OUTPATIENT
Start: 2024-04-17

## 2024-04-17 RX ORDER — GLIPIZIDE 5 MG/1
TABLET ORAL
Qty: 180 TABLET | Refills: 0 | Status: SHIPPED | OUTPATIENT
Start: 2024-04-17

## 2024-04-17 RX ORDER — SITAGLIPTIN AND METFORMIN HYDROCHLORIDE 1000; 50 MG/1; MG/1
TABLET, FILM COATED, EXTENDED RELEASE ORAL
Qty: 180 TABLET | Refills: 3 | Status: SHIPPED | OUTPATIENT
Start: 2024-04-17

## 2024-04-17 NOTE — PROGRESS NOTES
Chief Complaint   Patient presents with    Diabetes    Follow-up        BP (!) 143/88 (Site: Left Upper Arm, Position: Sitting, Cuff Size: Large Adult)   Pulse 85   Temp 97.8 °F (36.6 °C) (Oral)   Resp 16   Ht 1.753 m (5' 9\")   Wt 115.7 kg (255 lb)   SpO2 98%   BMI 37.66 kg/m²      1. Have you been to the ER, urgent care clinic since your last visit?  Hospitalized since your last visit?No    2. Have you seen or consulted any other health care providers outside of the [facility] since your last visit?  Include any pap smears or colon screening. No  
jan 2023- these labs were done on clomid and they were normal     He feels fine   and he continues to take it       6. He follows  Dr. Lul Hopson   , had stress test in the past       7.   PAD  -  ordered MYRIAM    he has darkened toe on right foot   after wearing a shoe for short period of time             Reviewed results with patient and discussed the labs being ordered today/bnv   Patient voiced understanding of plan of care

## 2024-04-17 NOTE — PATIENT INSTRUCTIONS
SPECIFIC INSTRUCTIONS BELOW         Check blood sugars BEFORE EACH MEAL          JARDIANCE  25   mg once a day before b-fast ( drink plenty of water )      0.5 mg  ozempic   Once a week      Stay on  JANUMET XR 50/1000 MG TWICE  A DAY WITH MEALS       glipizide 5 mg  One  PILL    twice a day , twenty min before b-fast and dinner      Do not take it if you are not eating   Cut the pill to half if eating lesser than normal   Do not avoid lunches         Stay  on  lipitor 20 at night       ---------------------------------------------------------------------------------------------------        DRINK water   Do not skip meals  Do not eat in between meals     Reduce carbs- pasta, rice, potatoes, bread   Try to avoid processed bread products like BISCUITS, CROISSANTS, MUFFINS     Do not drink juices or sodas, even if they are calorie zero or diet drinks and especially avoid using powders like crystalloids , SAUL-AIDS      Do not eat peanut butter      Do not eat sugar free cookies and cakes   Do not eat peaches, oranges, pineapples, raisins, grapes , canteloupe , honey dew, mangoes , watermelon  and fruit medleys           -------------PAY ATTENTION TO THESE GENERAL INSTRUCTIONS -----------------        - The medications prescribed at this visit will not be available at pharmacy until 6 pm         - YOUR MED LIST IS NOT UP TO DATE AS SOME CHANGES ARE BEING MADE AFTER THE VISIT - FOLLOW SPECIFIC INSTRUCTIONS  ABOVE      -ANY tests other than blood work, which you opt to do  outside the  [facility] imaging facilities, you are responsible for prior authorizations if  required     - HEALTH MAINTENANCE IS NOT GOING TO BE UP TO DATE ON YOUR AVS- PLEASE IGNORE      Results      *Normal results will not be notified by a phone call starting January 1 2021   *If you have an upcoming visit, the results will be discussed at the visit   *Please sign up for MY CHART if you want access to your lab and test results  *Abnormal results

## 2024-04-30 DIAGNOSIS — E11.65 TYPE 2 DIABETES MELLITUS WITH HYPERGLYCEMIA, WITHOUT LONG-TERM CURRENT USE OF INSULIN (HCC): ICD-10-CM

## 2024-04-30 DIAGNOSIS — Z79.4 CONTROLLED TYPE 2 DIABETES MELLITUS WITHOUT COMPLICATION, WITH LONG-TERM CURRENT USE OF INSULIN (HCC): Primary | ICD-10-CM

## 2024-04-30 DIAGNOSIS — E11.9 CONTROLLED TYPE 2 DIABETES MELLITUS WITHOUT COMPLICATION, WITH LONG-TERM CURRENT USE OF INSULIN (HCC): Primary | ICD-10-CM

## 2024-05-23 ENCOUNTER — HOSPITAL ENCOUNTER (OUTPATIENT)
Facility: HOSPITAL | Age: 59
Discharge: HOME | End: 2024-05-25
Attending: INTERNAL MEDICINE
Payer: COMMERCIAL

## 2024-05-23 DIAGNOSIS — E11.65 TYPE 2 DIABETES MELLITUS WITH HYPERGLYCEMIA, WITHOUT LONG-TERM CURRENT USE OF INSULIN (HCC): ICD-10-CM

## 2024-05-23 PROCEDURE — 93922 UPR/L XTREMITY ART 2 LEVELS: CPT

## 2024-05-24 LAB
VAS LEFT ABI: 1.08
VAS LEFT ARM BP: 133 MMHG
VAS LEFT DORSALIS PEDIS BP: 133 MMHG
VAS LEFT PTA BP: 147 MMHG
VAS LEFT TBI: 0.63
VAS LEFT TOE PRESSURE: 86 MMHG
VAS RIGHT ABI: 1.17
VAS RIGHT ARM BP: 136 MMHG
VAS RIGHT DORSALIS PEDIS BP: 144 MMHG
VAS RIGHT PTA BP: 159 MMHG
VAS RIGHT TBI: 0.63
VAS RIGHT TOE PRESSURE: 85 MMHG

## 2024-05-25 PROBLEM — I73.9 PVD (PERIPHERAL VASCULAR DISEASE) (HCC): Status: ACTIVE | Noted: 2024-05-25

## 2024-07-30 NOTE — PRE-PROCEDURE INSTRUCTIONS
PAT completed with the patient.  Patient had questions on his prep and was advised to call the office.  Patient will be here at 0845 on 8/7 for his colonoscopy.

## 2024-08-07 ENCOUNTER — ANESTHESIA EVENT (OUTPATIENT)
Facility: HOSPITAL | Age: 59
End: 2024-08-07
Payer: COMMERCIAL

## 2024-08-07 ENCOUNTER — ANESTHESIA (OUTPATIENT)
Facility: HOSPITAL | Age: 59
End: 2024-08-07
Payer: COMMERCIAL

## 2024-08-07 ENCOUNTER — HOSPITAL ENCOUNTER (OUTPATIENT)
Facility: HOSPITAL | Age: 59
Setting detail: OUTPATIENT SURGERY
Discharge: HOME | End: 2024-08-07
Attending: INTERNAL MEDICINE | Admitting: INTERNAL MEDICINE
Payer: COMMERCIAL

## 2024-08-07 VITALS
DIASTOLIC BLOOD PRESSURE: 93 MMHG | BODY MASS INDEX: 38.1 KG/M2 | RESPIRATION RATE: 18 BRPM | HEART RATE: 78 BPM | WEIGHT: 258 LBS | OXYGEN SATURATION: 98 % | SYSTOLIC BLOOD PRESSURE: 144 MMHG | TEMPERATURE: 97.4 F

## 2024-08-07 LAB
GLUCOSE BLD STRIP.AUTO-MCNC: 188 MG/DL (ref 65–100)
PERFORMED BY:: ABNORMAL

## 2024-08-07 PROCEDURE — 2580000003 HC RX 258: Performed by: INTERNAL MEDICINE

## 2024-08-07 PROCEDURE — 3700000000 HC ANESTHESIA ATTENDED CARE: Performed by: INTERNAL MEDICINE

## 2024-08-07 PROCEDURE — 2709999900 HC NON-CHARGEABLE SUPPLY: Performed by: INTERNAL MEDICINE

## 2024-08-07 PROCEDURE — 3600007501: Performed by: INTERNAL MEDICINE

## 2024-08-07 PROCEDURE — 6360000002 HC RX W HCPCS: Performed by: NURSE PRACTITIONER

## 2024-08-07 PROCEDURE — 7100000011 HC PHASE II RECOVERY - ADDTL 15 MIN: Performed by: INTERNAL MEDICINE

## 2024-08-07 PROCEDURE — 3600007511: Performed by: INTERNAL MEDICINE

## 2024-08-07 PROCEDURE — 82962 GLUCOSE BLOOD TEST: CPT

## 2024-08-07 PROCEDURE — 7100000010 HC PHASE II RECOVERY - FIRST 15 MIN: Performed by: INTERNAL MEDICINE

## 2024-08-07 PROCEDURE — 3700000001 HC ADD 15 MINUTES (ANESTHESIA): Performed by: INTERNAL MEDICINE

## 2024-08-07 PROCEDURE — 2500000003 HC RX 250 WO HCPCS: Performed by: NURSE PRACTITIONER

## 2024-08-07 RX ORDER — SODIUM CHLORIDE 9 MG/ML
INJECTION, SOLUTION INTRAVENOUS CONTINUOUS
Status: DISCONTINUED | OUTPATIENT
Start: 2024-08-07 | End: 2024-08-07 | Stop reason: HOSPADM

## 2024-08-07 RX ORDER — LIDOCAINE HYDROCHLORIDE 20 MG/ML
INJECTION, SOLUTION EPIDURAL; INFILTRATION; INTRACAUDAL; PERINEURAL PRN
Status: DISCONTINUED | OUTPATIENT
Start: 2024-08-07 | End: 2024-08-07 | Stop reason: SDUPTHER

## 2024-08-07 RX ORDER — SODIUM CHLORIDE, SODIUM LACTATE, POTASSIUM CHLORIDE, CALCIUM CHLORIDE 600; 310; 30; 20 MG/100ML; MG/100ML; MG/100ML; MG/100ML
INJECTION, SOLUTION INTRAVENOUS CONTINUOUS
Status: DISCONTINUED | OUTPATIENT
Start: 2024-08-07 | End: 2024-08-07 | Stop reason: HOSPADM

## 2024-08-07 RX ORDER — LIDOCAINE HYDROCHLORIDE 20 MG/ML
INJECTION, SOLUTION EPIDURAL; INFILTRATION; INTRACAUDAL; PERINEURAL
Status: COMPLETED
Start: 2024-08-07 | End: 2024-08-07

## 2024-08-07 RX ORDER — PROPOFOL 10 MG/ML
INJECTION, EMULSION INTRAVENOUS
Status: COMPLETED
Start: 2024-08-07 | End: 2024-08-07

## 2024-08-07 RX ADMIN — PROPOFOL 100 MG: 10 INJECTION, EMULSION INTRAVENOUS at 09:58

## 2024-08-07 RX ADMIN — PROPOFOL 50 MG: 10 INJECTION, EMULSION INTRAVENOUS at 10:03

## 2024-08-07 RX ADMIN — LIDOCAINE HYDROCHLORIDE 100 MG: 20 SOLUTION INTRAVENOUS at 09:56

## 2024-08-07 RX ADMIN — SODIUM CHLORIDE, POTASSIUM CHLORIDE, SODIUM LACTATE AND CALCIUM CHLORIDE: 600; 310; 30; 20 INJECTION, SOLUTION INTRAVENOUS at 09:45

## 2024-08-07 ASSESSMENT — PAIN - FUNCTIONAL ASSESSMENT
PAIN_FUNCTIONAL_ASSESSMENT: NONE - DENIES PAIN
PAIN_FUNCTIONAL_ASSESSMENT: 0-10

## 2024-08-07 ASSESSMENT — PAIN SCALES - GENERAL: PAINLEVEL_OUTOF10: 0

## 2024-08-07 NOTE — ANESTHESIA PRE PROCEDURE
Department of Anesthesiology  Preprocedure Note       Name:  Fernando Rosa   Age:  58 y.o.  :  1965                                          MRN:  [id number]         Date:  2024      Surgeon: Surgeon(s):  Rudolph Connell MD    Procedure: Procedure(s):  COLONOSCOPY DIAGNOSTIC    Medications prior to admission:   Prior to Admission medications    Medication Sig Start Date End Date Taking? Authorizing Provider   SITagliptin-metFORMIN (JANUMET XR)  MG TB24 per extended release tablet 1 tablet daily 24   Kailey Aguayo MD   Semaglutide,0.25 or 0.5MG/DOS, (OZEMPIC, 0.25 OR 0.5 MG/DOSE,) 2 MG/1.5ML SOPN 0.5 mg a  week 24   Kailey Aguayo MD   glipiZIDE (GLUCOTROL) 5 MG tablet TAKE 1 TABLET BY MOUTH 20 MINUTES BEFORE BREAKFAST AND  BEFORE  DINNER 24   Kailey Aguayo MD   empagliflozin (JARDIANCE) 25 MG tablet Take 1 tablet by mouth daily 24   Kailey Aguayo MD   amLODIPine (NORVASC) 10 MG tablet Take 1 tablet by mouth once daily 21   Automatic Reconciliation, Ar   atorvastatin (LIPITOR) 20 MG tablet Take by mouth  Patient not taking: Reported on 2024   Automatic Reconciliation, Ar   coenzyme Q10 100 MG CAPS capsule Take by mouth daily    Automatic Reconciliation, Ar   losartan-hydroCHLOROthiazide (HYZAAR) 100-25 MG per tablet ceived the following from [facility]: Outside name: losartan-hydroCHLOROthiazide (HYZAAR) 100-25 mg per tablet 21   Automatic Reconciliation, Ar       Current medications:    Current Facility-Administered Medications   Medication Dose Route Frequency Provider Last Rate Last Admin    0.9 % sodium chloride infusion   IntraVENous Continuous Rudolph Connell MD        lactated ringers IV soln infusion   IntraVENous Continuous Rudolph Connell MD 50 mL/hr at 24 0946 NoRateChange at 24 0946    propofol 200 MG/20ML infusion             lidocaine PF 2 % injection                Allergies:    Allergies   Allergen

## 2024-08-07 NOTE — ANESTHESIA POSTPROCEDURE EVALUATION
Department of Anesthesiology  Postprocedure Note    Patient: Fernando Rosa  MRN: [id number]  YOB: 1965  Date of evaluation: 8/7/2024    Procedure Summary       Date: 08/07/24 Room / Location: [facility] ENDOSCOPY    Anesthesia Start: 0946 Anesthesia Stop: 1010    Procedure: COLONOSCOPY DIAGNOSTIC (Lower GI Region) Diagnosis:       Screen for colon cancer      (Screen for colon cancer [Z12.11])    Surgeons: Rudolph Connell MD Responsible Provider: Jonny Lofton Jr., MD    Anesthesia Type: MAC ASA Status: 3            Anesthesia Type: MAC    Maral Phase I: Maral Score: 10    Mraal Phase II:      Anesthesia Post Evaluation    Patient location during evaluation: bedside (Endoscopy Unit)  Patient participation: complete - patient participated  Level of consciousness: sleepy but conscious  Pain score: 0  Airway patency: patent  Nausea & Vomiting: no nausea and no vomiting  Cardiovascular status: hemodynamically stable  Respiratory status: acceptable  Hydration status: stable  Comments: This patient remained on the stretcher.  The patient was handed off to the endoscopy nursing team.  All questions regarding pre-, intra-, and postoperative care were answered.  Multimodal analgesia pain management approach    No notable events documented.

## 2024-08-07 NOTE — PERIOP NOTE
Patient alert and oriented x 4 with respirations even and unlabored on RA. Patient discharged home per physician's order. Patient transported via wheelchair to front hospital entrance with pt's spouse present to transport patient via private vehicle.

## 2024-08-07 NOTE — DISCHARGE INSTRUCTIONS
Recommendation:         -  Repeat colonoscopy in 3 years for surveillance.         -  Resume previous diet.                       -  Return to endoscopist PRN.

## 2024-10-09 ENCOUNTER — OFFICE VISIT (OUTPATIENT)
Age: 59
End: 2024-10-09
Payer: COMMERCIAL

## 2024-10-09 VITALS
HEART RATE: 89 BPM | WEIGHT: 254.8 LBS | TEMPERATURE: 98.2 F | SYSTOLIC BLOOD PRESSURE: 150 MMHG | OXYGEN SATURATION: 94 % | HEIGHT: 69 IN | BODY MASS INDEX: 37.74 KG/M2 | DIASTOLIC BLOOD PRESSURE: 94 MMHG

## 2024-10-09 DIAGNOSIS — E78.2 MIXED HYPERLIPIDEMIA: ICD-10-CM

## 2024-10-09 DIAGNOSIS — I10 ESSENTIAL (PRIMARY) HYPERTENSION: ICD-10-CM

## 2024-10-09 DIAGNOSIS — M25.512 ACUTE PAIN OF LEFT SHOULDER: Primary | ICD-10-CM

## 2024-10-09 DIAGNOSIS — E11.65 TYPE 2 DIABETES MELLITUS WITH HYPERGLYCEMIA, WITHOUT LONG-TERM CURRENT USE OF INSULIN (HCC): ICD-10-CM

## 2024-10-09 PROCEDURE — 3077F SYST BP >= 140 MM HG: CPT | Performed by: INTERNAL MEDICINE

## 2024-10-09 PROCEDURE — 3044F HG A1C LEVEL LT 7.0%: CPT | Performed by: INTERNAL MEDICINE

## 2024-10-09 PROCEDURE — 99214 OFFICE O/P EST MOD 30 MIN: CPT | Performed by: INTERNAL MEDICINE

## 2024-10-09 PROCEDURE — 3080F DIAST BP >= 90 MM HG: CPT | Performed by: INTERNAL MEDICINE

## 2024-10-09 RX ORDER — IBUPROFEN 800 MG/1
800 TABLET, FILM COATED ORAL 2 TIMES DAILY PRN
Qty: 60 TABLET | Refills: 0 | Status: SHIPPED | OUTPATIENT
Start: 2024-10-09

## 2024-10-09 RX ORDER — ATORVASTATIN CALCIUM 20 MG/1
TABLET, FILM COATED ORAL
Qty: 90 TABLET | Refills: 3 | Status: SHIPPED | OUTPATIENT
Start: 2024-10-09

## 2024-10-09 NOTE — PROGRESS NOTES
Fernando Rosa is a 58 y.o. male here for   Chief Complaint   Patient presents with    Diabetes    Follow-up       1. Have you been to the ER, urgent care clinic since your last visit?  Hospitalized since your last visit? -    2. Have you seen or consulted any other health care providers outside of the [facility] since your last visit?  Include any pap smears or colon screening.-     BP (!) 150/94 (Site: Left Upper Arm, Position: Sitting, Cuff Size: Large Adult)   Pulse 89   Temp 98.2 °F (36.8 °C) (Temporal)   Ht 1.753 m (5' 9\")   Wt 115.6 kg (254 lb 12.8 oz)   SpO2 94%   BMI 37.63 kg/m²

## 2024-10-09 NOTE — PATIENT INSTRUCTIONS
SPECIFIC INSTRUCTIONS BELOW         Check blood sugars BEFORE EACH MEAL          JARDIANCE  25   mg once a day before b-fast ( drink plenty of water )      0.5 mg  ozempic   Once a week      Stay on  JANUMET XR 50/1000 MG TWICE  A DAY WITH MEALS       glipizide 5 mg  One  PILL    twice a day , twenty min before b-fast and dinner      Do not take it if you are not eating   Cut the pill to half if eating lesser than normal   Do not avoid lunches         Stay  on  lipitor 20 at night       ---------------------------------------------------------------------------------------------------        DRINK water   Do not skip meals  Do not eat in between meals     Reduce carbs- pasta, rice, potatoes, bread   Try to avoid processed bread products like BISCUITS, CROISSANTS, MUFFINS     Do not drink juices or sodas, even if they are calorie zero or diet drinks and especially avoid using powders like crystalloids , SAUL-AIDS      Do not eat peanut butter      Do not eat sugar free cookies and cakes   Do not eat peaches, oranges, pineapples, raisins, grapes , canteloupe , honey dew, mangoes , watermelon  and fruit medleys           -------------PAY ATTENTION TO THESE GENERAL INSTRUCTIONS -----------------        - The medications prescribed at this visit will not be available at pharmacy until 6 pm         - YOUR MED LIST IS NOT UP TO DATE AS SOME CHANGES ARE BEING MADE AFTER THE VISIT - FOLLOW SPECIFIC INSTRUCTIONS  ABOVE      -ANY tests other than blood work, which you opt to do  outside the  [facility] imaging facilities, you are responsible for prior authorizations if  required     - HEALTH MAINTENANCE IS NOT GOING TO BE UP TO DATE ON YOUR AVS- PLEASE IGNORE      Results      *Normal results will not be notified by a phone call starting January 1 2021   *If you have an upcoming visit, the results will be discussed at the visit   *Please sign up for MY CHART if you want access to your lab and test results  *Abnormal results

## 2024-10-09 NOTE — PROGRESS NOTES
[facility] DIABETES AND ENDOCRINOLOGY                Kailey Aguayo MD FACE          HISTORY OF PRESENT ILLNESS          Fernando Rosa is a 58   y.o. male.       HPI    F/u for DM 2 after last seen   from in  April 2024   He hurt his left shoulder   at work  and  he is in pain   , requesting for high dose advil   Tylenol  or  low dose advil not helping him       Weight is stable   No meter or log   No labs         April 2024     Lost 3 lb   No meter / log        October 2023     Lost 3 lbs since last visit           Old history  :    He visits once every 2 years, that has become norm for him   He could not f/u sooner   Because of insurance reasons he says    He get KG xr at no cost - surprise?    He could nto tolerate Trulicity for severe GI side effects , stopped it    He could nto get victoza as plan changed      He has no meter, had not f/u with his pcp    He does feel  hyperglycemic symptoms , polyuria and extreme thirst          Ros :   Constitutional: NAD    Gi - constipation    Psychiatric: He has a normal mood and affect.             Physical Exam    Constitutional: He is oriented to person, place, and time. He appears well-developed and well-nourished.    Psychiatric: He has a normal mood and affect.         Labs    Diabetes    Lab Results   Component Value Date    LABA1C 6.9 (H) 04/10/2024    LABA1C 7.0 (H) 10/05/2023    LABA1C 7.3 (H) 03/31/2023          Assessment and PLAN          1. TYPE 2 DM  unCONTROLLED  :  A1C is     6.9 %      from  April 2024   compared to   7%     from    October 2023     compared to    7.3 %   from      April 2023    compared to     7%   from   October 2022    compared to    7 %  From  MAy 2022   Compared to    8.7 %   From   Dec 2021 by  POC     Compared to  7.6 %      From today by Nov 2020    Compared to   6.2 %     From    Jan 17 2019     compared to    7.9 %  BY poc  OCT 2019         October 2024      Can't comment    on   DM   CONTROL   Stay on glipizide  5

## 2024-10-10 LAB
ALBUMIN SERPL-MCNC: 4.1 G/DL (ref 3.5–5)
ALBUMIN/GLOB SERPL: 1 (ref 1.1–2.2)
ALP SERPL-CCNC: 133 U/L (ref 45–117)
ALT SERPL-CCNC: 34 U/L (ref 12–78)
ANION GAP SERPL CALC-SCNC: 7 MMOL/L (ref 2–12)
AST SERPL-CCNC: 15 U/L (ref 15–37)
BILIRUB SERPL-MCNC: 1.1 MG/DL (ref 0.2–1)
BUN SERPL-MCNC: 12 MG/DL (ref 6–20)
BUN/CREAT SERPL: 12 (ref 12–20)
CALCIUM SERPL-MCNC: 10 MG/DL (ref 8.5–10.1)
CHLORIDE SERPL-SCNC: 101 MMOL/L (ref 97–108)
CHOLEST SERPL-MCNC: 198 MG/DL
CO2 SERPL-SCNC: 29 MMOL/L (ref 21–32)
CREAT SERPL-MCNC: 0.98 MG/DL (ref 0.7–1.3)
CREAT UR-MCNC: 88.6 MG/DL
EST. AVERAGE GLUCOSE BLD GHB EST-MCNC: 189 MG/DL
GLOBULIN SER CALC-MCNC: 4.2 G/DL (ref 2–4)
GLUCOSE SERPL-MCNC: 181 MG/DL (ref 65–100)
HBA1C MFR BLD: 8.2 % (ref 4–5.6)
HDLC SERPL-MCNC: 47 MG/DL
HDLC SERPL: 4.2 (ref 0–5)
LDLC SERPL CALC-MCNC: 136.2 MG/DL (ref 0–100)
MICROALBUMIN UR-MCNC: 3.12 MG/DL
MICROALBUMIN/CREAT UR-RTO: 35 MG/G (ref 0–30)
POTASSIUM SERPL-SCNC: 3.9 MMOL/L (ref 3.5–5.1)
PROT SERPL-MCNC: 8.3 G/DL (ref 6.4–8.2)
SODIUM SERPL-SCNC: 137 MMOL/L (ref 136–145)
TRIGL SERPL-MCNC: 74 MG/DL
VLDLC SERPL CALC-MCNC: 14.8 MG/DL

## 2025-03-06 ENCOUNTER — HOSPITAL ENCOUNTER (OUTPATIENT)
Facility: HOSPITAL | Age: 60
Discharge: HOME | End: 2025-03-09

## 2025-03-06 VITALS — WEIGHT: 260 LBS | BODY MASS INDEX: 38.4 KG/M2

## 2025-03-06 DIAGNOSIS — S43.432A ANTERIOR TO POSTERIOR TEAR OF SUPERIOR GLENOID LABRUM OF LEFT SHOULDER: ICD-10-CM

## 2025-03-06 DIAGNOSIS — M24.419 RECURRENT SUBLUXATION OF SHOULDER WITH MULTIDIRECTIONAL INSTABILITY: ICD-10-CM

## 2025-03-06 DIAGNOSIS — S46.912A SHOULDER STRAIN, LEFT, INITIAL ENCOUNTER: ICD-10-CM

## 2025-03-13 ENCOUNTER — HOSPITAL ENCOUNTER (OUTPATIENT)
Facility: HOSPITAL | Age: 60
Discharge: HOME | End: 2025-03-16
Payer: COMMERCIAL

## 2025-03-13 VITALS — WEIGHT: 260 LBS | BODY MASS INDEX: 38.4 KG/M2

## 2025-03-13 PROCEDURE — 73223 MRI JOINT UPR EXTR W/O&W/DYE: CPT

## 2025-03-13 PROCEDURE — A9579 GAD-BASE MR CONTRAST NOS,1ML: HCPCS | Performed by: RADIOLOGY

## 2025-03-13 PROCEDURE — 6360000004 HC RX CONTRAST MEDICATION: Performed by: RADIOLOGY

## 2025-03-13 RX ADMIN — GADOTERIDOL 20 ML: 279.3 INJECTION, SOLUTION INTRAVENOUS at 14:41

## 2025-04-04 ENCOUNTER — LAB (OUTPATIENT)
Age: 60
End: 2025-04-04

## 2025-04-04 DIAGNOSIS — M25.512 ACUTE PAIN OF LEFT SHOULDER: ICD-10-CM

## 2025-04-04 DIAGNOSIS — E78.2 MIXED HYPERLIPIDEMIA: ICD-10-CM

## 2025-04-04 DIAGNOSIS — I10 ESSENTIAL (PRIMARY) HYPERTENSION: ICD-10-CM

## 2025-04-04 DIAGNOSIS — E11.65 TYPE 2 DIABETES MELLITUS WITH HYPERGLYCEMIA, WITHOUT LONG-TERM CURRENT USE OF INSULIN (HCC): ICD-10-CM

## 2025-04-04 LAB
ALBUMIN SERPL-MCNC: 3.8 G/DL (ref 3.5–5)
ALBUMIN/GLOB SERPL: 1 (ref 1.1–2.2)
ALP SERPL-CCNC: 96 U/L (ref 45–117)
ALT SERPL-CCNC: 25 U/L (ref 12–78)
ANION GAP SERPL CALC-SCNC: 4 MMOL/L (ref 2–12)
AST SERPL-CCNC: 14 U/L (ref 15–37)
BILIRUB SERPL-MCNC: 1.9 MG/DL (ref 0.2–1)
BUN SERPL-MCNC: 12 MG/DL (ref 6–20)
BUN/CREAT SERPL: 13 (ref 12–20)
CALCIUM SERPL-MCNC: 9.6 MG/DL (ref 8.5–10.1)
CHLORIDE SERPL-SCNC: 102 MMOL/L (ref 97–108)
CHOLEST SERPL-MCNC: 96 MG/DL
CO2 SERPL-SCNC: 29 MMOL/L (ref 21–32)
CREAT SERPL-MCNC: 0.94 MG/DL (ref 0.7–1.3)
CREAT UR-MCNC: 86.7 MG/DL
EST. AVERAGE GLUCOSE BLD GHB EST-MCNC: 128 MG/DL
GLOBULIN SER CALC-MCNC: 3.8 G/DL (ref 2–4)
GLUCOSE SERPL-MCNC: 136 MG/DL (ref 65–100)
HBA1C MFR BLD: 6.1 % (ref 4–5.6)
HDLC SERPL-MCNC: 47 MG/DL
HDLC SERPL: 2 (ref 0–5)
LDLC SERPL CALC-MCNC: 35.2 MG/DL (ref 0–100)
MICROALBUMIN UR-MCNC: 1.7 MG/DL
MICROALBUMIN/CREAT UR-RTO: 20 MG/G (ref 0–30)
POTASSIUM SERPL-SCNC: 3.8 MMOL/L (ref 3.5–5.1)
PROT SERPL-MCNC: 7.6 G/DL (ref 6.4–8.2)
SODIUM SERPL-SCNC: 135 MMOL/L (ref 136–145)
TRIGL SERPL-MCNC: 69 MG/DL
VLDLC SERPL CALC-MCNC: 13.8 MG/DL

## 2025-05-15 ENCOUNTER — OFFICE VISIT (OUTPATIENT)
Age: 60
End: 2025-05-15

## 2025-05-15 VITALS
OXYGEN SATURATION: 98 % | HEIGHT: 69 IN | TEMPERATURE: 98.2 F | HEART RATE: 89 BPM | BODY MASS INDEX: 37.56 KG/M2 | DIASTOLIC BLOOD PRESSURE: 96 MMHG | SYSTOLIC BLOOD PRESSURE: 162 MMHG | WEIGHT: 253.6 LBS

## 2025-05-15 DIAGNOSIS — E78.2 MIXED HYPERLIPIDEMIA: ICD-10-CM

## 2025-05-15 DIAGNOSIS — E11.65 TYPE 2 DIABETES MELLITUS WITH HYPERGLYCEMIA, WITHOUT LONG-TERM CURRENT USE OF INSULIN (HCC): Primary | ICD-10-CM

## 2025-05-15 DIAGNOSIS — F41.9 ANXIETY DISORDER, UNSPECIFIED TYPE: ICD-10-CM

## 2025-05-15 DIAGNOSIS — E29.1 TESTICULAR HYPOFUNCTION: ICD-10-CM

## 2025-05-15 DIAGNOSIS — I10 ESSENTIAL (PRIMARY) HYPERTENSION: ICD-10-CM

## 2025-05-15 RX ORDER — ESCITALOPRAM OXALATE 10 MG/1
10 TABLET ORAL DAILY
Qty: 30 TABLET | Refills: 3 | Status: SHIPPED | OUTPATIENT
Start: 2025-05-15

## 2025-05-15 RX ORDER — SITAGLIPTIN AND METFORMIN HYDROCHLORIDE 1000; 50 MG/1; MG/1
TABLET, FILM COATED, EXTENDED RELEASE ORAL
Qty: 180 TABLET | Refills: 3 | Status: ACTIVE | OUTPATIENT
Start: 2025-05-15

## 2025-05-15 RX ORDER — HYDROXYZINE HYDROCHLORIDE 25 MG/1
25 TABLET, FILM COATED ORAL EVERY 4 HOURS PRN
COMMUNITY
End: 2025-05-15 | Stop reason: ALTCHOICE

## 2025-05-15 RX ORDER — GLIPIZIDE 5 MG/1
TABLET ORAL
Qty: 180 TABLET | Refills: 0 | Status: SHIPPED | OUTPATIENT
Start: 2025-05-15

## 2025-05-15 RX ORDER — ATORVASTATIN CALCIUM 20 MG/1
TABLET, FILM COATED ORAL
Qty: 90 TABLET | Refills: 3 | Status: SHIPPED | OUTPATIENT
Start: 2025-05-15

## 2025-05-15 RX ORDER — ALPRAZOLAM 0.25 MG
TABLET ORAL
Qty: 30 TABLET | Refills: 1 | Status: SHIPPED | OUTPATIENT
Start: 2025-05-15 | End: 2025-05-15

## 2025-05-15 NOTE — PROGRESS NOTES
Fernando Rosa is a 59 y.o. male here for   Chief Complaint   Patient presents with    Diabetes       1. Have you been to the ER, urgent care clinic since your last visit?  Hospitalized since your last visit? -[place] 9 days foot infection, [facility] anxiety    2. Have you seen or consulted any other health care providers outside of the [facility] since your last visit?  Include any pap smears or colon screening.-No

## 2025-05-15 NOTE — PATIENT INSTRUCTIONS
SPECIFIC INSTRUCTIONS BELOW         Check blood sugars BEFORE EACH MEAL          JARDIANCE  25   mg once a day before b-fast ( drink plenty of water )         Stay on  JANUMET XR 50/1000 MG TWICE  A DAY WITH MEALS         glipizide 5 mg  One  PILL    twice a day , twenty min before b-fast and dinner      Do not take it if you are not eating   Cut the pill to half if eating lesser than normal   Do not avoid lunches         Stay  on  lipitor 20 at night       ---------------------------------------------------------------------------------------------------        DRINK water   Do not skip meals  Do not eat in between meals     Reduce carbs- pasta, rice, potatoes, bread   Try to avoid processed bread products like BISCUITS, CROISSANTS, MUFFINS     Do not drink juices or sodas, even if they are calorie zero or diet drinks and especially avoid using powders like crystalloids , SAUL-AIDS      Do not eat peanut butter      Do not eat sugar free cookies and cakes   Do not eat peaches, oranges, pineapples, raisins, grapes , canteloupe , honey dew, mangoes , watermelon  and fruit medleys           -------------PAY ATTENTION TO THESE GENERAL INSTRUCTIONS -----------------        - The medications prescribed at this visit will not be available at pharmacy until 6 pm         - YOUR MED LIST IS NOT UP TO DATE AS SOME CHANGES ARE BEING MADE AFTER THE VISIT - FOLLOW SPECIFIC INSTRUCTIONS  ABOVE      -ANY tests other than blood work, which you opt to do  outside the  [facility] imaging facilities, you are responsible for prior authorizations if  required     - HEALTH MAINTENANCE IS NOT GOING TO BE UP TO DATE ON YOUR AVS- PLEASE IGNORE      Results      *Normal results will not be notified by a phone call starting January 1 2021   *If you have an upcoming visit, the results will be discussed at the visit   *Please sign up for MY CHART if you want access to your lab and test results  *Abnormal results which require immediate

## 2025-05-15 NOTE — PROGRESS NOTES
[facility] DIABETES AND ENDOCRINOLOGY                Kailey Aguayo MD FACE          HISTORY OF PRESENT ILLNESS          Fernando Rosa is a 59    y.o. male.       HPI    F/u for DM 2 after last seen   from in  October 2024     He reports  anxiety spells ( could not got to [place] / vacation had severe anxiety  on plane rides )   Drove back  in car - went to  [place]     He had right foot injury- infection cleared  and   is in shanthi boot  ( not working )   His podiatrist , Dr. Sesay   amputated lateral metatarsel   in jan 2025 ( stepped in on screw )   He has severe anxiety  ( shivering )           October 2024     He hurt his left shoulder   at work  and  he is in pain   , requesting for high dose advil   Tylenol  or  low dose advil not helping him   Weight is stable   No meter or log   No labs         April 2024     Lost 3 lb   No meter / log        October 2023     Lost 3 lbs since last visit           Old history  :    He visits once every 2 years, that has become norm for him   He could not f/u sooner   Because of insurance reasons he says    He get KG xr at no cost - surprise?    He could nto tolerate Trulicity for severe GI side effects , stopped it    He could nto get victoza as plan changed      He has no meter, had not f/u with his pcp    He does feel  hyperglycemic symptoms , polyuria and extreme thirst          Ros :   Constitutional: NAD    Gi - constipation    Psychiatric: He has a normal mood and affect.             Physical Exam    Constitutional: He is oriented to person, place, and time. He appears well-developed and well-nourished.    Psychiatric: He has a normal mood and affect.   RIGHT  foot in shanthi boot         Labs    Lab Results   Component Value Date    LABA1C 6.1 (H) 04/04/2025    LABA1C 8.2 (H) 10/09/2024    LABA1C 6.9 (H) 04/10/2024          Assessment and PLAN       Severe anxiety disorder  :  has an appt   went ahead and started on lexapro and prn xanax         2 . TYPE 2 DM

## 2025-05-19 ENCOUNTER — TELEPHONE (OUTPATIENT)
Age: 60
End: 2025-05-19

## 2025-05-19 NOTE — TELEPHONE ENCOUNTER
Josh Rx needed med clarification Janumet XR, Sig incorrect, gave verbal pt to take twice daily not once daily.

## 2025-06-16 ENCOUNTER — OFFICE VISIT (OUTPATIENT)
Age: 60
End: 2025-06-16
Payer: COMMERCIAL

## 2025-06-16 VITALS
HEART RATE: 78 BPM | OXYGEN SATURATION: 96 % | WEIGHT: 254.8 LBS | DIASTOLIC BLOOD PRESSURE: 89 MMHG | HEIGHT: 69 IN | RESPIRATION RATE: 18 BRPM | SYSTOLIC BLOOD PRESSURE: 147 MMHG | BODY MASS INDEX: 37.74 KG/M2 | TEMPERATURE: 98.7 F

## 2025-06-16 DIAGNOSIS — I10 PRIMARY HYPERTENSION: ICD-10-CM

## 2025-06-16 DIAGNOSIS — E11.65 TYPE 2 DIABETES MELLITUS WITH HYPERGLYCEMIA, WITHOUT LONG-TERM CURRENT USE OF INSULIN (HCC): Primary | ICD-10-CM

## 2025-06-16 DIAGNOSIS — E78.2 MIXED HYPERLIPIDEMIA: ICD-10-CM

## 2025-06-16 PROCEDURE — 3079F DIAST BP 80-89 MM HG: CPT | Performed by: INTERNAL MEDICINE

## 2025-06-16 PROCEDURE — 99214 OFFICE O/P EST MOD 30 MIN: CPT | Performed by: INTERNAL MEDICINE

## 2025-06-16 PROCEDURE — 3077F SYST BP >= 140 MM HG: CPT | Performed by: INTERNAL MEDICINE

## 2025-06-16 PROCEDURE — 3044F HG A1C LEVEL LT 7.0%: CPT | Performed by: INTERNAL MEDICINE

## 2025-06-16 RX ORDER — METOPROLOL SUCCINATE 25 MG/1
25 TABLET, EXTENDED RELEASE ORAL DAILY
COMMUNITY

## 2025-06-16 RX ORDER — METFORMIN HYDROCHLORIDE 750 MG/1
TABLET, EXTENDED RELEASE ORAL
Qty: 180 TABLET | Refills: 3 | Status: SHIPPED | OUTPATIENT
Start: 2025-06-16

## 2025-06-16 ASSESSMENT — PATIENT HEALTH QUESTIONNAIRE - PHQ9
SUM OF ALL RESPONSES TO PHQ QUESTIONS 1-9: 0
SUM OF ALL RESPONSES TO PHQ QUESTIONS 1-9: 0
1. LITTLE INTEREST OR PLEASURE IN DOING THINGS: NOT AT ALL
2. FEELING DOWN, DEPRESSED OR HOPELESS: NOT AT ALL
SUM OF ALL RESPONSES TO PHQ QUESTIONS 1-9: 0
SUM OF ALL RESPONSES TO PHQ QUESTIONS 1-9: 0

## 2025-06-16 NOTE — PATIENT INSTRUCTIONS
SPECIFIC INSTRUCTIONS BELOW         Check blood sugars BEFORE EACH MEAL          JARDIANCE  25   mg once a day before b-fast ( drink plenty of water )         Stop  janumet xr    Metformin sr   750 MG TWICE  A DAY WITH MEALS         glipizide 5 mg  One  PILL    twice a day , twenty min before b-fast and dinner      Do not take it if you are not eating   Cut the pill to half if eating lesser than normal   Do not avoid lunches         Stay  on  lipitor 20 at night       ---------------------------------------------------------------------------------------------------

## 2025-06-16 NOTE — PROGRESS NOTES
Fernando Rosa is a 59 y.o. male here for   Chief Complaint   Patient presents with    Hypertension    Diabetes       1. Have you been to the ER, urgent care clinic since your last visit?  Hospitalized since your last visit? -NO    2. Have you seen or consulted any other health care providers outside of the [facility] since your last visit?  Include any pap smears or colon screening.-Dr. Morris - [facility] physician.    
feels fine   and he continues to take it     October 2024  - Fertilaid  (   gets it from [employer])        May 2025  - [facility] he [facility] per advise of his wife       6. He follows  Dr. Lul Hopson   , pt is told he is at high risk for CAD       7.   PAD  -     - developed  right foot ulcer / amputation  - to review podiatry records   He worries about bills etc., and not clear why he is still out o f work  though     May 2024   1.  Right ankle-brachial index is 1.17, normal  2.  Left ankle-brachial index is 1.08, normal  3.  Right TBI is 0.63, mild peripheral artery disease.  4.  Left TBI is 0.63, mild peripheral artery disease.           8.   He has work related shoulder injury, prescribing high dose advil for short term use only     Educated him about bad effects of  advil , including GERD,  and  renal damage         9.  Severe anxiety disorder  :  saw psychologist   May 2025  -   started on lexapro and prn xanax             Reviewed results with patient and discussed the labs being ordered today/bnv   Patient voiced understanding of plan of care

## 2025-07-10 ENCOUNTER — OFFICE VISIT (OUTPATIENT)
Facility: CLINIC | Age: 60
End: 2025-07-10
Payer: COMMERCIAL

## 2025-07-10 VITALS
OXYGEN SATURATION: 95 % | HEIGHT: 70 IN | BODY MASS INDEX: 36.94 KG/M2 | WEIGHT: 258 LBS | RESPIRATION RATE: 16 BRPM | TEMPERATURE: 98.3 F | SYSTOLIC BLOOD PRESSURE: 138 MMHG | DIASTOLIC BLOOD PRESSURE: 86 MMHG | HEART RATE: 54 BPM

## 2025-07-10 DIAGNOSIS — R35.0 URINARY FREQUENCY: ICD-10-CM

## 2025-07-10 DIAGNOSIS — E78.00 PURE HYPERCHOLESTEROLEMIA: ICD-10-CM

## 2025-07-10 DIAGNOSIS — Z76.89 ENCOUNTER TO ESTABLISH CARE: Primary | ICD-10-CM

## 2025-07-10 DIAGNOSIS — Z00.00 WELL ADULT EXAM: ICD-10-CM

## 2025-07-10 DIAGNOSIS — E11.65 TYPE 2 DIABETES MELLITUS WITH HYPERGLYCEMIA, WITHOUT LONG-TERM CURRENT USE OF INSULIN (HCC): ICD-10-CM

## 2025-07-10 DIAGNOSIS — I10 PRIMARY HYPERTENSION: ICD-10-CM

## 2025-07-10 PROBLEM — L02.01 ABSCESS OF FACE: Status: RESOLVED | Noted: 2020-09-02 | Resolved: 2025-07-10

## 2025-07-10 PROCEDURE — 3079F DIAST BP 80-89 MM HG: CPT | Performed by: STUDENT IN AN ORGANIZED HEALTH CARE EDUCATION/TRAINING PROGRAM

## 2025-07-10 PROCEDURE — 99204 OFFICE O/P NEW MOD 45 MIN: CPT | Performed by: STUDENT IN AN ORGANIZED HEALTH CARE EDUCATION/TRAINING PROGRAM

## 2025-07-10 PROCEDURE — 99386 PREV VISIT NEW AGE 40-64: CPT | Performed by: STUDENT IN AN ORGANIZED HEALTH CARE EDUCATION/TRAINING PROGRAM

## 2025-07-10 PROCEDURE — 3075F SYST BP GE 130 - 139MM HG: CPT | Performed by: STUDENT IN AN ORGANIZED HEALTH CARE EDUCATION/TRAINING PROGRAM

## 2025-07-10 PROCEDURE — 3044F HG A1C LEVEL LT 7.0%: CPT | Performed by: STUDENT IN AN ORGANIZED HEALTH CARE EDUCATION/TRAINING PROGRAM

## 2025-07-10 SDOH — ECONOMIC STABILITY: FOOD INSECURITY: WITHIN THE PAST 12 MONTHS, THE FOOD YOU BOUGHT JUST DIDN'T LAST AND YOU DIDN'T HAVE MONEY TO GET MORE.: NEVER TRUE

## 2025-07-10 SDOH — ECONOMIC STABILITY: FOOD INSECURITY: WITHIN THE PAST 12 MONTHS, YOU WORRIED THAT YOUR FOOD WOULD RUN OUT BEFORE YOU GOT MONEY TO BUY MORE.: NEVER TRUE

## 2025-07-10 ASSESSMENT — ENCOUNTER SYMPTOMS
ABDOMINAL DISTENTION: 0
WHEEZING: 0
ABDOMINAL PAIN: 0
SHORTNESS OF BREATH: 0

## 2025-07-10 NOTE — PROGRESS NOTES
Chief Complaint   Patient presents with    New Patient     Patient states that he wants to establish care.     Have you been to the ER, urgent care clinic since your last visit?  Hospitalized since your last visit?   YES - When: approximately 1 months ago.  Where and Why: [facility] for a his right foot.    Have you seen or consulted any other health care providers outside our system since your last visit?   YES - When: approximately 7 months ago.  Where and Why: [facility] for Podiatrist.      “Have you had a diabetic eye exam?”    NO     No diabetic eye exam on file

## 2025-07-10 NOTE — PROGRESS NOTES
Fernando Rosa (:  1965) is a 59 y.o. male, New patient, here for evaluation of the following chief complaint(s):  New Patient (Patient states that he wants to establish care.)         Assessment & Plan  1. Diabetes Mellitus.  - His A1c level is commendably at 6.1%, which is within the target range for diabetics.  - An eye examination was recommended to monitor for any potential diabetic retinopathy.  - Discussed the importance of regular eye exams to prevent complications related to diabetes.  - Encouraged to maintain current diabetes management practices.  - Follows with endocrinology.    2. Hypertension.  - His heart rate today is 54, which is below the normal range of .  - Blood pressure readings have been fluctuating, with recent readings of 118/66 and 147/93.  - Advised to discuss with his cardiologist about reducing his metoprolol dosage to 12.5 mg, which could potentially increase his heart rate slightly.  - Instructed to monitor his blood pressure once daily, preferably at night when he is relaxed, and to record these readings in [employer].    3. Urinary Frequency.  - Urinary frequency could be attributed to hydrochlorothiazide medication or possibly an enlarged prostate.  - Given that his blood sugar levels are well-controlled, it is unlikely that diabetes is the cause.  - Prefers to schedule an appointment with his urologist to further evaluate the cause of urinary frequency.        Results  Labs   - Hemoglobin: Normal   - A1c: 2025, 6.1%  1. Encounter to establish care  2. Well adult exam  3. Primary hypertension  -     [facility]  4. Type 2 diabetes mellitus with hyperglycemia, without long-term current use of insulin (HCC)  5. Pure hypercholesterolemia  6. Urinary frequency    No follow-ups on file.       Subjective   History of Present Illness  The patient presents for a new patient visit and overall health exam.    He has not had an eye exam in several years

## 2025-07-10 NOTE — PATIENT INSTRUCTIONS
much sun, wash your hands, brush your teeth twice a day, and wear a seat belt in the car.   Where can you learn more?  Go to https://[email or web address].net/patientEd and enter P072 to learn more about \"Well Visit, Ages 18 to 65: Care Instructions.\"  Current as of: April 30, 2024  Content Version: 14.5  © 1295-4343 [employer].   Care instructions adapted under license by [employer]. If you have questions about a medical condition or this instruction, always ask your healthcare professional. [employer] disclaims any warranty or liability for your use of this information.

## 2025-07-24 ENCOUNTER — OFFICE VISIT (OUTPATIENT)
Facility: CLINIC | Age: 60
End: 2025-07-24
Payer: COMMERCIAL

## 2025-07-24 VITALS
DIASTOLIC BLOOD PRESSURE: 85 MMHG | BODY MASS INDEX: 37.54 KG/M2 | HEIGHT: 70 IN | TEMPERATURE: 98.3 F | HEART RATE: 88 BPM | WEIGHT: 262.2 LBS | OXYGEN SATURATION: 97 % | RESPIRATION RATE: 16 BRPM | SYSTOLIC BLOOD PRESSURE: 140 MMHG

## 2025-07-24 DIAGNOSIS — S99.922A INJURY OF LEFT FOOT, INITIAL ENCOUNTER: ICD-10-CM

## 2025-07-24 DIAGNOSIS — L97.521 ULCER OF LEFT FOOT, LIMITED TO BREAKDOWN OF SKIN (HCC): ICD-10-CM

## 2025-07-24 DIAGNOSIS — E11.65 TYPE 2 DIABETES MELLITUS WITH HYPERGLYCEMIA, WITHOUT LONG-TERM CURRENT USE OF INSULIN (HCC): ICD-10-CM

## 2025-07-24 DIAGNOSIS — K59.09 CHRONIC CONSTIPATION: ICD-10-CM

## 2025-07-24 DIAGNOSIS — E66.01 SEVERE OBESITY (HCC): ICD-10-CM

## 2025-07-24 DIAGNOSIS — I11.9 MALIGNANT HYPERTENSIVE HEART DISEASE WITHOUT HEART FAILURE: Primary | ICD-10-CM

## 2025-07-24 DIAGNOSIS — I73.9 PVD (PERIPHERAL VASCULAR DISEASE): ICD-10-CM

## 2025-07-24 PROCEDURE — 3077F SYST BP >= 140 MM HG: CPT | Performed by: FAMILY MEDICINE

## 2025-07-24 PROCEDURE — 99213 OFFICE O/P EST LOW 20 MIN: CPT | Performed by: FAMILY MEDICINE

## 2025-07-24 PROCEDURE — 3079F DIAST BP 80-89 MM HG: CPT | Performed by: FAMILY MEDICINE

## 2025-07-24 PROCEDURE — 3044F HG A1C LEVEL LT 7.0%: CPT | Performed by: FAMILY MEDICINE

## 2025-07-24 RX ORDER — LABETALOL 100 MG/1
100 TABLET, FILM COATED ORAL 2 TIMES DAILY
COMMUNITY
Start: 2025-07-23 | End: 2026-07-23

## 2025-07-24 RX ORDER — SILVER SULFADIAZINE 10 MG/G
CREAM TOPICAL
COMMUNITY
Start: 2025-07-22

## 2025-07-24 NOTE — PROGRESS NOTES
Chief Complaint   Patient presents with    New Patient     Pt here to re-establish primary care     BP (!) 140/85 (BP Site: Left Upper Arm, Patient Position: Sitting, BP Cuff Size: Large Adult)   Pulse 88   Temp 98.3 °F (36.8 °C) (Oral)   Resp 16   Ht 1.778 m (5' 10\")   Wt 118.9 kg (262 lb 3.2 oz)   SpO2 97%   BMI 37.62 kg/m²         Have you been to the ER, urgent care clinic since your last visit?  Hospitalized since your last visit?   NO    Have you seen or consulted any other health care providers outside our system since your last visit?   YES, [facility]      “Have you had a diabetic eye exam?”    NO     No diabetic eye exam on file        
tablet      metoprolol succinate (TOPROL XL) 25 MG extended release tablet Take 1 tablet by mouth daily (Patient not taking: Reported on 7/24/2025)      metFORMIN (GLUCOPHAGE-XR) 750 MG extended release tablet One pill twice  a  day   with meals     STOP janumet xr (Patient not taking: Reported on 7/24/2025) 180 tablet 3     No current facility-administered medications for this visit.     Allergies   Allergen Reactions    Codeine Other (See Comments)     Pt stated that it makes him \"crazy\"-not aware of actions.    Dulaglutide Nausea And Vomiting       Objective:  BP (!) 140/85 (BP Site: Left Upper Arm, Patient Position: Sitting, BP Cuff Size: Large Adult)   Pulse 88   Temp 98.3 °F (36.8 °C) (Oral)   Resp 16   Ht 1.778 m (5' 10\")   Wt 118.9 kg (262 lb 3.2 oz)   SpO2 97%   BMI 37.62 kg/m²     Physical Exam:   Physical Exam  Vitals and nursing note reviewed.   Constitutional:       Appearance: Normal appearance.   HENT:      Head: Normocephalic and atraumatic.      Right Ear: Tympanic membrane, ear canal and external ear normal.      Left Ear: Tympanic membrane, ear canal and external ear normal.      Nose: Nose normal.      Mouth/Throat:      Mouth: Mucous membranes are moist.      Pharynx: Oropharynx is clear.   Eyes:      Extraocular Movements: Extraocular movements intact.      Conjunctiva/sclera: Conjunctivae normal.      Pupils: Pupils are equal, round, and reactive to light.   Cardiovascular:      Rate and Rhythm: Normal rate and regular rhythm.      Pulses: Normal pulses.      Heart sounds: Normal heart sounds.   Pulmonary:      Effort: Pulmonary effort is normal.      Breath sounds: Normal breath sounds.   Abdominal:      General: Abdomen is flat. Bowel sounds are normal.      Palpations: Abdomen is soft.   Musculoskeletal:         General: Normal range of motion.      Cervical back: Normal range of motion and neck supple.   Skin:     General: Skin is warm and dry.      Capillary Refill: Capillary

## 2025-07-25 PROBLEM — L97.521 ULCER OF LEFT FOOT, LIMITED TO BREAKDOWN OF SKIN (HCC): Status: ACTIVE | Noted: 2025-07-25

## 2025-07-25 PROBLEM — S99.922A INJURY OF LEFT FOOT: Status: ACTIVE | Noted: 2025-07-25

## 2025-08-19 ENCOUNTER — OFFICE VISIT (OUTPATIENT)
Age: 60
End: 2025-08-19
Payer: COMMERCIAL

## 2025-08-19 VITALS
DIASTOLIC BLOOD PRESSURE: 79 MMHG | SYSTOLIC BLOOD PRESSURE: 124 MMHG | BODY MASS INDEX: 37.54 KG/M2 | HEART RATE: 95 BPM | OXYGEN SATURATION: 97 % | TEMPERATURE: 97.8 F | HEIGHT: 70 IN | WEIGHT: 262.2 LBS

## 2025-08-19 DIAGNOSIS — F41.9 ANXIETY DISORDER, UNSPECIFIED TYPE: ICD-10-CM

## 2025-08-19 DIAGNOSIS — I10 ESSENTIAL (PRIMARY) HYPERTENSION: ICD-10-CM

## 2025-08-19 DIAGNOSIS — E29.1 TESTICULAR HYPOFUNCTION: ICD-10-CM

## 2025-08-19 DIAGNOSIS — E78.2 MIXED HYPERLIPIDEMIA: ICD-10-CM

## 2025-08-19 DIAGNOSIS — E11.65 TYPE 2 DIABETES MELLITUS WITH HYPERGLYCEMIA, WITHOUT LONG-TERM CURRENT USE OF INSULIN (HCC): Primary | ICD-10-CM

## 2025-08-19 LAB — HBA1C MFR BLD: 9.8 %

## 2025-08-19 PROCEDURE — 83036 HEMOGLOBIN GLYCOSYLATED A1C: CPT | Performed by: INTERNAL MEDICINE

## 2025-08-19 PROCEDURE — 99215 OFFICE O/P EST HI 40 MIN: CPT | Performed by: INTERNAL MEDICINE

## 2025-08-19 PROCEDURE — 3046F HEMOGLOBIN A1C LEVEL >9.0%: CPT | Performed by: INTERNAL MEDICINE

## 2025-08-19 PROCEDURE — 3078F DIAST BP <80 MM HG: CPT | Performed by: INTERNAL MEDICINE

## 2025-08-19 PROCEDURE — 3074F SYST BP LT 130 MM HG: CPT | Performed by: INTERNAL MEDICINE

## 2025-08-19 RX ORDER — ESCITALOPRAM OXALATE 10 MG/1
10 TABLET ORAL DAILY
Qty: 30 TABLET | Refills: 3 | Status: SHIPPED | OUTPATIENT
Start: 2025-08-19

## 2025-08-19 RX ORDER — GLIPIZIDE 5 MG/1
TABLET ORAL
Qty: 180 TABLET | Refills: 2 | Status: SHIPPED | OUTPATIENT
Start: 2025-08-19

## (undated) DEVICE — CANNULA NASAL ADULT 10FT ETCO2/CO2 VENTFLO

## (undated) DEVICE — MASK ANES INF SZ 2 PREM TAIL VLV INFL PRT UNSCENTED SGL PT

## (undated) DEVICE — MASK O2 MED AD 7 FT 3 IN 1 W/ STD CONN LTX

## (undated) DEVICE — ENDOSCOPIC KIT 1.1+ DE BOWL